# Patient Record
Sex: FEMALE | Race: WHITE | Employment: FULL TIME | URBAN - METROPOLITAN AREA
[De-identification: names, ages, dates, MRNs, and addresses within clinical notes are randomized per-mention and may not be internally consistent; named-entity substitution may affect disease eponyms.]

---

## 2017-01-12 DIAGNOSIS — R92.2 DENSE BREAST TISSUE ON MAMMOGRAM: Primary | ICD-10-CM

## 2017-02-03 DIAGNOSIS — N63.10 MASS OF RIGHT BREAST ON MAMMOGRAM: Primary | ICD-10-CM

## 2017-11-14 ENCOUNTER — HOSPITAL ENCOUNTER (OUTPATIENT)
Age: 57
Setting detail: SPECIMEN
Discharge: HOME | End: 2017-11-14
Payer: COMMERCIAL

## 2017-11-14 ENCOUNTER — OFFICE VISIT (OUTPATIENT)
Dept: OBGYN CLINIC | Age: 57
End: 2017-11-14
Payer: COMMERCIAL

## 2017-11-14 VITALS
SYSTOLIC BLOOD PRESSURE: 134 MMHG | OXYGEN SATURATION: 100 % | BODY MASS INDEX: 32.07 KG/M2 | WEIGHT: 174.25 LBS | DIASTOLIC BLOOD PRESSURE: 86 MMHG | HEART RATE: 70 BPM | HEIGHT: 62 IN

## 2017-11-14 DIAGNOSIS — Z01.419 ENCOUNTER FOR WELL WOMAN EXAM WITH ROUTINE GYNECOLOGICAL EXAM: Primary | ICD-10-CM

## 2017-11-14 DIAGNOSIS — N63.10 BREAST MASS, RIGHT: ICD-10-CM

## 2017-11-14 PROCEDURE — 99396 PREV VISIT EST AGE 40-64: CPT | Performed by: OBSTETRICS & GYNECOLOGY

## 2017-11-15 NOTE — PROGRESS NOTES
Dino Hemphill  2017              61 y.o. Chief Complaint   Patient presents with    Annual Exam     2016 pap wnl isaias 17 abnormal              No referring provider defined for this encounter. The patient was seen and examined. She has no chief complaint today and is here for her annual exam.  Her bowels are regular. There are no voiding complaints. She denies any bloating. She denies vaginal discharge    HPI : 60yo  for annual exam, no gyn complaints.   ________________________________________________________________________    Past Medical History:   Diagnosis Date    Mitral valve prolapse     Thyroid disease                                                                    Past Surgical History:   Procedure Laterality Date    OVARY REMOVAL Right     PILONIDAL CYST EXCISION      SINUS SURGERY      SINUS SURGERY  14    TONSILLECTOMY       Family History   Problem Relation Age of Onset    Breast Cancer Mother     Lung Cancer Father     Colon Cancer Paternal Grandmother     Lung Cancer Brother     Colon Cancer Brother     Colon Cancer Sister      History   Smoking Status    Never Smoker   Smokeless Tobacco    Never Used     History   Alcohol Use    Yes     Comment: occasionally       MEDICATIONS:  Current Outpatient Prescriptions   Medication Sig Dispense Refill    levothyroxine (SYNTHROID) 100 MCG tablet 1 tablet      vitamin E 400 UNIT capsule Take 1,600 Units by mouth daily.  Ascorbic Acid (VITAMIN C GUMMIE PO) Take 2 tablets by mouth daily.  oxyCODONE-acetaminophen (PERCOCET) 5-325 MG per tablet 1 tablet      cephALEXin (KEFLEX) 500 MG capsule Take 1 capsule by mouth 4 times daily. 40 capsule 0    ibuprofen (ADVIL;MOTRIN) 400 MG tablet Take 400 mg by mouth every 6 hours as needed for Pain.  levothyroxine (SYNTHROID) 50 MCG tablet Take 50 mcg by mouth Daily.  guaiFENesin 400 MG tablet Take 800 mg by mouth 3 times daily.       fluticasone Arthritis,Gout,Osteoporosis or Rheumatism  Neurological ROS: No CVA, Migraines, Epilepsy, Seizure Hx, or Limb Weakness  Dermatological ROS: No Rash, Itching, Hives, Mole Changes or Cancer                                                                                                                                                                                                                                PHYSICAL Exam:     Constitutional:  Blood pressure 134/86, pulse 70, height 5' 2\" (1.575 m), weight 174 lb 4 oz (79 kg), last menstrual period 09/05/2014, SpO2 100 %, not currently breastfeeding. General Appearance: This  is a well Developed, well Nourished, well groomed female. Her BMI was reviewed. Nutritional decision making was discussed. Skin:  There was a Normal Inspection of the skin without rashes or lesions. There were no rashes. (Papular, Maculopapular, Hives, Pustular, Macular)     There were no lesions (Ulcers, Erythema, Abn. Appearing Nevi)        Lymphatic:  No Lymph Nodes were Palpable in the neck , axilla or groin. Inguinal lymph nodes wnl     Neck and EENT:  The neck was supple. There were no masses   The thyroid was not enlarged and had no masses. Respiratory: The lungs were auscultated and found to be clear. There were no rales, rhonchi or wheezes. There was a good respiratory effort. Cardiovascular: The heart was in a regular rate and rhythm. . No S3 or S4. There was no murmur appreciated. Location, grade, and radiation are not applicable. Extremities: The patients extremities were without calf tenderness, edema, or varicosities. There was full range of motion in all four extremities. Abdomen: The abdomen was soft and non-tender. There were good bowel sounds in all quadrants and there was no guarding, rebound or rigidity. On evaluation there was no evidence of hepatosplenomegaly and there was no costal vertebral sarah tenderness bilaterally.   No hernias

## 2017-11-22 LAB — CYTOLOGY REPORT: NORMAL

## 2017-11-28 ENCOUNTER — HOSPITAL ENCOUNTER (OUTPATIENT)
Age: 57
Setting detail: SPECIMEN
Discharge: HOME | End: 2017-11-28
Payer: COMMERCIAL

## 2017-11-28 ENCOUNTER — OFFICE VISIT (OUTPATIENT)
Dept: OBGYN CLINIC | Age: 57
End: 2017-11-28
Payer: COMMERCIAL

## 2017-11-28 VITALS
HEIGHT: 62 IN | DIASTOLIC BLOOD PRESSURE: 89 MMHG | OXYGEN SATURATION: 97 % | WEIGHT: 174.25 LBS | BODY MASS INDEX: 32.07 KG/M2 | HEART RATE: 63 BPM | SYSTOLIC BLOOD PRESSURE: 123 MMHG

## 2017-11-28 DIAGNOSIS — N90.89 VULVAL LESION: Primary | ICD-10-CM

## 2017-11-28 PROCEDURE — 56605 BIOPSY OF VULVA/PERINEUM: CPT | Performed by: OBSTETRICS & GYNECOLOGY

## 2017-11-29 NOTE — PROGRESS NOTES
Vulvar lesion excision:    Area prepped with Betadine, injected with 1% lidocaine, using 3mm Keys punch area excised in entirety. Removed with scissors and hemostasis obtained with silver nitrate. Tolerated well. Counts correct. Follow up pathology. Wound care reviewed.

## 2017-11-30 LAB — SURGICAL PATHOLOGY REPORT: NORMAL

## 2017-12-04 ENCOUNTER — HOSPITAL ENCOUNTER (OUTPATIENT)
Dept: ULTRASOUND IMAGING | Age: 57
Discharge: HOME | End: 2017-12-04
Payer: COMMERCIAL

## 2017-12-04 ENCOUNTER — HOSPITAL ENCOUNTER (OUTPATIENT)
Dept: MAMMOGRAPHY | Age: 57
Discharge: HOME | End: 2017-12-04
Payer: COMMERCIAL

## 2017-12-04 DIAGNOSIS — N63.10 MASS OF RIGHT BREAST ON MAMMOGRAM: ICD-10-CM

## 2017-12-04 DIAGNOSIS — N63.10 BREAST MASS, RIGHT: ICD-10-CM

## 2017-12-04 PROCEDURE — 76642 ULTRASOUND BREAST LIMITED: CPT

## 2017-12-04 PROCEDURE — G0279 TOMOSYNTHESIS, MAMMO: HCPCS

## 2019-03-04 ENCOUNTER — HOSPITAL ENCOUNTER (OUTPATIENT)
Age: 59
Setting detail: SPECIMEN
Discharge: HOME | End: 2019-03-04
Payer: COMMERCIAL

## 2019-03-04 ENCOUNTER — OFFICE VISIT (OUTPATIENT)
Dept: OBGYN CLINIC | Age: 59
End: 2019-03-04
Payer: COMMERCIAL

## 2019-03-04 VITALS
SYSTOLIC BLOOD PRESSURE: 127 MMHG | WEIGHT: 200 LBS | BODY MASS INDEX: 36.58 KG/M2 | HEART RATE: 69 BPM | DIASTOLIC BLOOD PRESSURE: 82 MMHG

## 2019-03-04 DIAGNOSIS — Z01.419 WELL WOMAN EXAM WITH ROUTINE GYNECOLOGICAL EXAM: Primary | ICD-10-CM

## 2019-03-04 DIAGNOSIS — Z12.39 BREAST CANCER SCREENING: ICD-10-CM

## 2019-03-04 DIAGNOSIS — Z12.11 COLON CANCER SCREENING: ICD-10-CM

## 2019-03-04 PROCEDURE — G8484 FLU IMMUNIZE NO ADMIN: HCPCS | Performed by: OBSTETRICS & GYNECOLOGY

## 2019-03-04 PROCEDURE — 99396 PREV VISIT EST AGE 40-64: CPT | Performed by: OBSTETRICS & GYNECOLOGY

## 2019-03-15 LAB — CYTOLOGY REPORT: NORMAL

## 2019-03-26 ENCOUNTER — HOSPITAL ENCOUNTER (OUTPATIENT)
Dept: MAMMOGRAPHY | Age: 59
Discharge: HOME | End: 2019-03-28
Payer: COMMERCIAL

## 2019-03-26 ENCOUNTER — TELEPHONE (OUTPATIENT)
Dept: GASTROENTEROLOGY | Age: 59
End: 2019-03-26

## 2019-03-26 DIAGNOSIS — Z12.39 BREAST CANCER SCREENING: ICD-10-CM

## 2019-03-26 PROCEDURE — 77063 BREAST TOMOSYNTHESIS BI: CPT

## 2019-10-12 ENCOUNTER — HOSPITAL ENCOUNTER (EMERGENCY)
Age: 59
Discharge: HOME | End: 2019-10-12
Attending: EMERGENCY MEDICINE
Payer: COMMERCIAL

## 2019-10-12 ENCOUNTER — APPOINTMENT (OUTPATIENT)
Dept: GENERAL RADIOLOGY | Age: 59
End: 2019-10-12
Payer: COMMERCIAL

## 2019-10-12 VITALS
DIASTOLIC BLOOD PRESSURE: 83 MMHG | RESPIRATION RATE: 18 BRPM | HEART RATE: 67 BPM | WEIGHT: 200 LBS | BODY MASS INDEX: 36.58 KG/M2 | OXYGEN SATURATION: 99 % | TEMPERATURE: 97.3 F | SYSTOLIC BLOOD PRESSURE: 138 MMHG

## 2019-10-12 DIAGNOSIS — S62.502A CLOSED FRACTURE DISLOCATION OF LEFT THUMB, INITIAL ENCOUNTER: Primary | ICD-10-CM

## 2019-10-12 PROCEDURE — 96372 THER/PROPH/DIAG INJ SC/IM: CPT

## 2019-10-12 PROCEDURE — 73060 X-RAY EXAM OF HUMERUS: CPT

## 2019-10-12 PROCEDURE — 6360000002 HC RX W HCPCS: Performed by: STUDENT IN AN ORGANIZED HEALTH CARE EDUCATION/TRAINING PROGRAM

## 2019-10-12 PROCEDURE — 99283 EMERGENCY DEPT VISIT LOW MDM: CPT

## 2019-10-12 PROCEDURE — 6370000000 HC RX 637 (ALT 250 FOR IP): Performed by: STUDENT IN AN ORGANIZED HEALTH CARE EDUCATION/TRAINING PROGRAM

## 2019-10-12 PROCEDURE — 73130 X-RAY EXAM OF HAND: CPT

## 2019-10-12 RX ORDER — IBUPROFEN 800 MG/1
800 TABLET ORAL EVERY 6 HOURS PRN
Qty: 21 TABLET | Refills: 0 | Status: SHIPPED | OUTPATIENT
Start: 2019-10-12

## 2019-10-12 RX ORDER — ACETAMINOPHEN 325 MG/1
650 TABLET ORAL ONCE
Status: COMPLETED | OUTPATIENT
Start: 2019-10-12 | End: 2019-10-12

## 2019-10-12 RX ORDER — KETOROLAC TROMETHAMINE 15 MG/ML
15 INJECTION, SOLUTION INTRAMUSCULAR; INTRAVENOUS ONCE
Status: COMPLETED | OUTPATIENT
Start: 2019-10-12 | End: 2019-10-12

## 2019-10-12 RX ADMIN — ACETAMINOPHEN 650 MG: 325 TABLET ORAL at 18:36

## 2019-10-12 RX ADMIN — KETOROLAC TROMETHAMINE 15 MG: 15 INJECTION, SOLUTION INTRAMUSCULAR; INTRAVENOUS at 18:36

## 2019-10-12 ASSESSMENT — PAIN DESCRIPTION - LOCATION: LOCATION: FINGER (COMMENT WHICH ONE)

## 2019-10-12 ASSESSMENT — PAIN SCALES - GENERAL
PAINLEVEL_OUTOF10: 7
PAINLEVEL_OUTOF10: 7

## 2019-10-12 ASSESSMENT — PAIN DESCRIPTION - FREQUENCY: FREQUENCY: CONTINUOUS

## 2019-10-12 ASSESSMENT — PAIN DESCRIPTION - DESCRIPTORS: DESCRIPTORS: ACHING;THROBBING

## 2019-10-12 ASSESSMENT — PAIN DESCRIPTION - PAIN TYPE: TYPE: ACUTE PAIN

## 2019-10-12 ASSESSMENT — PAIN DESCRIPTION - ORIENTATION: ORIENTATION: LEFT

## 2019-10-22 ENCOUNTER — OFFICE VISIT (OUTPATIENT)
Dept: BURN CARE | Age: 59
End: 2019-10-22
Payer: COMMERCIAL

## 2019-10-22 VITALS
SYSTOLIC BLOOD PRESSURE: 123 MMHG | HEIGHT: 62 IN | HEART RATE: 56 BPM | DIASTOLIC BLOOD PRESSURE: 78 MMHG | WEIGHT: 199.6 LBS | BODY MASS INDEX: 36.73 KG/M2

## 2019-10-22 DIAGNOSIS — S63.642A SPRAIN OF METACARPOPHALANGEAL (MCP) JOINT OF LEFT THUMB, INITIAL ENCOUNTER: Primary | ICD-10-CM

## 2019-10-22 PROCEDURE — 99202 OFFICE O/P NEW SF 15 MIN: CPT | Performed by: PLASTIC SURGERY

## 2019-11-12 ENCOUNTER — OFFICE VISIT (OUTPATIENT)
Dept: BURN CARE | Age: 59
End: 2019-11-12
Payer: COMMERCIAL

## 2019-11-12 VITALS
SYSTOLIC BLOOD PRESSURE: 134 MMHG | HEIGHT: 62 IN | HEART RATE: 82 BPM | WEIGHT: 195 LBS | BODY MASS INDEX: 35.88 KG/M2 | DIASTOLIC BLOOD PRESSURE: 79 MMHG

## 2019-11-12 DIAGNOSIS — S62.292D: Primary | ICD-10-CM

## 2019-11-12 PROCEDURE — 99212 OFFICE O/P EST SF 10 MIN: CPT

## 2019-11-12 PROCEDURE — 99212 OFFICE O/P EST SF 10 MIN: CPT | Performed by: PLASTIC SURGERY

## 2019-11-26 ENCOUNTER — OFFICE VISIT (OUTPATIENT)
Dept: BURN CARE | Age: 59
End: 2019-11-26
Payer: COMMERCIAL

## 2019-11-26 VITALS
WEIGHT: 187 LBS | HEIGHT: 62 IN | DIASTOLIC BLOOD PRESSURE: 88 MMHG | HEART RATE: 62 BPM | BODY MASS INDEX: 34.41 KG/M2 | SYSTOLIC BLOOD PRESSURE: 148 MMHG

## 2019-11-26 DIAGNOSIS — S69.90XD INJURY OF HAND, UNSPECIFIED LATERALITY, SUBSEQUENT ENCOUNTER: ICD-10-CM

## 2019-11-26 PROCEDURE — 99212 OFFICE O/P EST SF 10 MIN: CPT | Performed by: PLASTIC SURGERY

## 2020-01-08 ENCOUNTER — APPOINTMENT (OUTPATIENT)
Dept: GENERAL RADIOLOGY | Age: 60
End: 2020-01-08
Payer: COMMERCIAL

## 2020-01-08 ENCOUNTER — HOSPITAL ENCOUNTER (EMERGENCY)
Age: 60
Discharge: HOME | End: 2020-01-08
Attending: EMERGENCY MEDICINE
Payer: COMMERCIAL

## 2020-01-08 VITALS
DIASTOLIC BLOOD PRESSURE: 85 MMHG | OXYGEN SATURATION: 99 % | RESPIRATION RATE: 14 BRPM | HEIGHT: 62 IN | TEMPERATURE: 98.1 F | BODY MASS INDEX: 34.04 KG/M2 | WEIGHT: 185 LBS | HEART RATE: 71 BPM | SYSTOLIC BLOOD PRESSURE: 151 MMHG

## 2020-01-08 PROCEDURE — 99283 EMERGENCY DEPT VISIT LOW MDM: CPT

## 2020-01-08 PROCEDURE — 29125 APPL SHORT ARM SPLINT STATIC: CPT

## 2020-01-08 PROCEDURE — 73130 X-RAY EXAM OF HAND: CPT

## 2020-01-08 ASSESSMENT — ENCOUNTER SYMPTOMS
COUGH: 0
WHEEZING: 0
NAUSEA: 0
COLOR CHANGE: 0
ABDOMINAL PAIN: 0
VOMITING: 0

## 2020-01-08 ASSESSMENT — PAIN SCALES - GENERAL: PAINLEVEL_OUTOF10: 3

## 2020-01-08 ASSESSMENT — PAIN DESCRIPTION - ORIENTATION: ORIENTATION: LEFT

## 2020-01-08 ASSESSMENT — PAIN DESCRIPTION - DESCRIPTORS: DESCRIPTORS: ACHING;SHOOTING

## 2020-01-08 ASSESSMENT — PAIN DESCRIPTION - FREQUENCY: FREQUENCY: INTERMITTENT

## 2020-01-08 NOTE — ED PROVIDER NOTES
[facility]  ED  Emergency Department Encounter  Advanced Practice Provider     Pt Name: Tanvir Ramirez  MRN: [id number]  [place] 1960  Date of evaluation: 1/8/20  PCP:  No primary care provider on file. CHIEF COMPLAINT       Chief Complaint   Patient presents with    Hand Pain     left thumb pain, was seen here a while ago . .. grandson pulled on thumb a week ago , having pain and decreased ROM       HISTORY OF PRESENT ILLNESS  (Location/Symptom, Timing/Onset,Context/Setting, Quality, Duration, Modifying Factors, Severity.)      Tanvir Ramirez is a 60 y.o. female who presents with left thumb pain. Patient states that about 1 week ago her grandson playfully tugged on her left thumb and ever since then she has had swelling and pain. In October she did fall and had a fracture to her thumb and a ligamentous injury. She was splinted and then in a Velcro splint but states that her range of motion had gotten much much better and the pain had gone away prior to 1 week ago. She denies any numbness or tingling. She is right-hand dominant. She has been attempting to call the specialty clinic but has been unable to get any response    PAST MEDICAL /SURGICAL / SOCIAL / FAMILY HISTORY      has a past medical history of Mitral valve prolapse and Thyroid disease. has a past surgical history that includes sinus surgery; Tonsillectomy; Pilonidal cyst excision; Ovary removal (Right); and sinus surgery (11/14/14).     Social History     Socioeconomic History    Marital status:      Spouse name: Not on file    Number of children: Not on file    Years of education: Not on file    Highest education level: Not on file   Occupational History    Not on file   Social Needs    Financial resource strain: Not on file    Food insecurity:     Worry: Not on file     Inability: Not on file    Transportation needs:     Medical: Not on file     Non-medical: Not on file   Tobacco Use    Smoking status: Never Smoker    Smokeless tobacco: Never Used   Substance and Sexual Activity    Alcohol use: Yes     Comment: occasionally    Drug use: No    Sexual activity: Yes     Partners: Male   Lifestyle    Physical activity:     Days per week: Not on file     Minutes per session: Not on file    Stress: Not on file   Relationships    Social connections:     Talks on phone: Not on file     Gets together: Not on file     Attends Ca[employer]ism service: Not on file     Active member of club or organization: Not on file     Attends meetings of clubs or organizations: Not on file     Relationship status: Not on file    Intimate partner violence:     Fear of current or ex partner: Not on file     Emotionally abused: Not on file     Physically abused: Not on file     Forced sexual activity: Not on file   Other Topics Concern    Not on file   Social History Narrative    Not on file       Family History   Problem Relation Age of Onset    Breast Cancer Mother    Waldo Card Father     Colon Cancer Paternal Grandmother     Lung Cancer Brother     Colon Cancer Brother     Colon Cancer Sister        Allergies:  Codeine and Demerol hcl [meperidine]    Home Medications:  Prior to Admission medications    Medication Sig Start Date End Date Taking? Authorizing Provider   ibuprofen (IBU) 800 MG tablet Take 1 tablet by mouth every 6 hours as needed for Pain 10/12/19   Larkrystal Ross, DO   BIOTIN PO Take by mouth    Historical Provider, MD   levothyroxine (SYNTHROID) 100 MCG tablet 1 tablet 11/3/16   Historical Provider, MD   fluticasone (FLONASE) 50 MCG/ACT nasal spray 2 sprays by Nasal route daily. Historical Provider, MD   vitamin E 400 UNIT capsule Take 1,600 Units by mouth daily. Historical Provider, MD   Ascorbic Acid (VITAMIN C GUMMIE PO) Take 2 tablets by mouth daily. Historical Provider, MD       patient's medication list has been reviewed as entered by the nursing staff.     REVIEW OF SYSTEMS    (2-9 systems for level 4, 10 or more for level 5)      Review of Systems   Constitutional: Negative for chills and fever. Respiratory: Negative for cough and wheezing. Cardiovascular: Negative for chest pain. Gastrointestinal: Negative for abdominal pain, nausea and vomiting. Musculoskeletal: Positive for arthralgias and myalgias. Skin: Negative for color change and wound. PHYSICAL EXAM  (up to 7 for level 4, 8 or more for level 5)      INITIAL VITALS:  height is 5' 2\" (1.575 m) and weight is 185 lb (83.9 kg). Her oral temperature is 98.1 °F (36.7 °C). Her blood pressure is 151/85 (abnormal) and her pulse is 71. Her respiration is 14 and oxygen saturation is 99%. Physical Exam  Constitutional:       Appearance: She is well-developed. She is not diaphoretic. HENT:      Head: Normocephalic and atraumatic. Right Ear: External ear normal.      Left Ear: External ear normal.   Eyes:      General: No scleral icterus. Right eye: No discharge. Left eye: No discharge. Neck:      Trachea: No tracheal deviation. Pulmonary:      Effort: Pulmonary effort is normal. No respiratory distress. Breath sounds: No stridor. Musculoskeletal:      Left hand: She exhibits decreased range of motion, tenderness, bony tenderness and swelling. She exhibits normal two-point discrimination, normal capillary refill, no deformity and no laceration. Comments: With diffuse swelling to the thumb and pain at the base of the thumb. There is ligamentous laxity with axial loading. There is reduced range of motion with adduction    Skin:     General: Skin is warm and dry. Neurological:      Mental Status: She is alert and oriented to person, place, and time.       Coordination: Coordination normal.   Psychiatric:         Behavior: Behavior normal.           PLAN (LABS / IMAGING / EKG):  Orders Placed This Encounter   Procedures    XR HAND LEFT (MIN 3 VIEWS)    Velcro Thumb Spica       MEDICATIONS

## 2020-01-08 NOTE — ED NOTES
Pt to ED with c/o left thumb pain. Pt states she was seen here before a while ago, but grandson pulled on thumb about a week ago and she's had continued worsening pain and limited ROM since. Pt states she f/u at clinic but worried something is wrong. Pt states she has been taking ibuprofen with limited relief. Pt arrived in NAD, rr even and unlabored.      Rapheal Crigler, RN  01/08/20 1516

## 2020-01-08 NOTE — ED PROVIDER NOTES
[facility]     Emergency Department     Faculty Attestation    I performed a history and physical examination of the patient and discussed management with the resident. I have reviewed and agree with the residents findings including all diagnostic interpretations, and treatment plans as written at the time of my review. Any areas of disagreement are noted on the chart. I was personally present for the key portions of any procedures. I have documented in the chart those procedures where I was not present during the key portions. For Physician Assistant/ Nurse Practitioner cases/documentation I have personally evaluated this patient and have completed at least one if not all key elements of the E/M (history, physical exam, and MDM). Additional findings are as noted. Primary Care Physician: No primary care provider on file. History: This is a 60 y.o. female who presents to the Emergency Department with complaint of thumb pain. The patient is a right-hand-dominant individual who had a her grandson pulled on her left thumb. She has had a previous injury to that left thumb. Since the injury 1 week ago she is having increasing pain and decreased range of motion. She is been using ice and Motrin without any significant improvement of her symptoms. Physical:   height is 5' 2\" (1.575 m) and weight is 185 lb (83.9 kg). Her oral temperature is 98.1 °F (36.7 °C). Her blood pressure is 151/85 (abnormal) and her pulse is 71. Her respiration is 14 and oxygen saturation is 99%. There is tenderness to palpation in the left thumb from the interphalangeal joint to the MCP. There is decreased range of motion secondary to pain.     Impression: Thumb pain    Plan: X-ray, analgesia      (Please note that portions of this note were completed with a voice recognition program.  Efforts were made to edit the dictations but occasionally words are

## 2020-01-28 ENCOUNTER — OFFICE VISIT (OUTPATIENT)
Dept: BURN CARE | Age: 60
End: 2020-01-28
Payer: COMMERCIAL

## 2020-01-28 VITALS
WEIGHT: 191 LBS | BODY MASS INDEX: 35.15 KG/M2 | SYSTOLIC BLOOD PRESSURE: 127 MMHG | HEIGHT: 62 IN | DIASTOLIC BLOOD PRESSURE: 84 MMHG | HEART RATE: 72 BPM

## 2020-01-28 PROCEDURE — 99212 OFFICE O/P EST SF 10 MIN: CPT | Performed by: PLASTIC SURGERY

## 2020-01-28 RX ORDER — CALCIPOTRIENE 50 UG/G
CREAM TOPICAL
COMMUNITY
Start: 2020-01-15

## 2020-03-10 ENCOUNTER — HOSPITAL ENCOUNTER (OUTPATIENT)
Age: 60
Setting detail: SPECIMEN
Discharge: HOME | End: 2020-03-10
Payer: COMMERCIAL

## 2020-03-10 ENCOUNTER — OFFICE VISIT (OUTPATIENT)
Dept: OBGYN CLINIC | Age: 60
End: 2020-03-10
Payer: COMMERCIAL

## 2020-03-10 VITALS
BODY MASS INDEX: 32.65 KG/M2 | DIASTOLIC BLOOD PRESSURE: 78 MMHG | SYSTOLIC BLOOD PRESSURE: 122 MMHG | WEIGHT: 196 LBS | HEIGHT: 65 IN

## 2020-03-10 PROCEDURE — 99396 PREV VISIT EST AGE 40-64: CPT | Performed by: OBSTETRICS & GYNECOLOGY

## 2020-03-10 ASSESSMENT — PATIENT HEALTH QUESTIONNAIRE - PHQ9
SUM OF ALL RESPONSES TO PHQ9 QUESTIONS 1 & 2: 0
1. LITTLE INTEREST OR PLEASURE IN DOING THINGS: 0
2. FEELING DOWN, DEPRESSED OR HOPELESS: 0
SUM OF ALL RESPONSES TO PHQ QUESTIONS 1-9: 0
SUM OF ALL RESPONSES TO PHQ QUESTIONS 1-9: 0

## 2020-03-10 NOTE — PROGRESS NOTES
rashes or lesions. Neck:  The neck was supple. There is no tracheal deviation, thyromegaly or supraclavicular adenopathy appreciated. Breast:   The patients breasts were symmetrical.  Breasts are nontender and there  were no masses, discharge or pathologic skin changes. There is no supraclavicular or axillary adenopathy bilaterally. Respiratory: There was unlabored respiratory effort. Lungs clear to ascultation without wheezes, rales or rhonchi in all fields bilaterally. Cardiovascular:  Normal sinus rhythm with a regular rate and without murmur, rubs or gallops. Abdomen: The abdomen was soft and non-tender with no guarding, rebound, CVAT or rigidity. No hernias were appreciated. Bowel sounds were normally active. Pelvic exam:  No vulvar, vaginal or cervical lesions are noted. Normal vaginal discharge present, grade 2 cystocele, moderate rectocele and enterocele noted. Uterus nongravid and without CMT and adnexa nontender and without abnormal masses bilaterally. Extremities:  FROM and nontender without clubbing cyanosis or edema. ASSESSMENT:         ICD-10-CM    1. Encounter for gynecological examination without abnormal finding Z01.419 PAP SMEAR   2. Visit for screening mammogram Z12.31 KEIRY DIGITAL SCREEN W CAD BILATERAL                   PLAN:    Return to the office in 1 year or when necessary  Patient was seen with total face to face time of 20 minutes. Ken Pleitez M.D., Mph  [facility]  [place]

## 2020-03-10 NOTE — PATIENT INSTRUCTIONS
Please get your mammogram done as scheduled. We will contact you with the results of your mammogram as well as today's Pap smear. Remember to call [facility] gastroenterology for your colonoscopy. 3  Return to the office in 1 year or as needed. Have a blessed year!

## 2020-03-19 LAB — CYTOLOGY REPORT: NORMAL

## 2020-05-29 ENCOUNTER — HOSPITAL ENCOUNTER (OUTPATIENT)
Age: 60
Setting detail: SPECIMEN
Discharge: HOME | End: 2020-05-29
Payer: COMMERCIAL

## 2020-05-29 LAB
ANION GAP SERPL CALCULATED.3IONS-SCNC: 18 MMOL/L (ref 9–17)
BUN BLDV-MCNC: 14 MG/DL (ref 8–23)
BUN/CREAT BLD: ABNORMAL (ref 9–20)
CALCIUM SERPL-MCNC: 9.3 MG/DL (ref 8.6–10.4)
CHLORIDE BLD-SCNC: 106 MMOL/L (ref 98–107)
CHOLESTEROL/HDL RATIO: 3
CHOLESTEROL: 230 MG/DL
CO2: 20 MMOL/L (ref 20–31)
CREAT SERPL-MCNC: 0.67 MG/DL (ref 0.5–0.9)
GFR AFRICAN AMERICAN: >60 ML/MIN
GFR NON-AFRICAN AMERICAN: >60 ML/MIN
GFR SERPL CREATININE-BSD FRML MDRD: ABNORMAL ML/MIN/{1.73_M2}
GFR SERPL CREATININE-BSD FRML MDRD: ABNORMAL ML/MIN/{1.73_M2}
GLUCOSE BLD-MCNC: 90 MG/DL (ref 70–99)
HCT VFR BLD CALC: 43.2 % (ref 36.3–47.1)
HDLC SERPL-MCNC: 77 MG/DL
HEMOGLOBIN: 13.4 G/DL (ref 11.9–15.1)
LDL CHOLESTEROL: 128 MG/DL (ref 0–130)
MCH RBC QN AUTO: 28 PG (ref 25.2–33.5)
MCHC RBC AUTO-ENTMCNC: 31 G/DL (ref 28.4–34.8)
MCV RBC AUTO: 90.4 FL (ref 82.6–102.9)
NRBC AUTOMATED: 0 PER 100 WBC
PDW BLD-RTO: 13.3 % (ref 11.8–14.4)
PLATELET # BLD: 291 K/UL (ref 138–453)
PMV BLD AUTO: 10.1 FL (ref 8.1–13.5)
POTASSIUM SERPL-SCNC: 4.4 MMOL/L (ref 3.7–5.3)
RBC # BLD: 4.78 M/UL (ref 3.95–5.11)
SODIUM BLD-SCNC: 144 MMOL/L (ref 135–144)
TRIGL SERPL-MCNC: 127 MG/DL
TSH SERPL DL<=0.05 MIU/L-ACNC: 2.77 MIU/L (ref 0.3–5)
VITAMIN B-12: 1686 PG/ML (ref 232–1245)
VITAMIN D 25-HYDROXY: 18.6 NG/ML (ref 30–100)
VLDLC SERPL CALC-MCNC: ABNORMAL MG/DL (ref 1–30)
WBC # BLD: 7.6 K/UL (ref 3.5–11.3)

## 2020-06-08 ENCOUNTER — HOSPITAL ENCOUNTER (OUTPATIENT)
Dept: MAMMOGRAPHY | Age: 60
Discharge: HOME | End: 2020-06-10
Payer: COMMERCIAL

## 2020-06-08 PROCEDURE — 77063 BREAST TOMOSYNTHESIS BI: CPT

## 2020-07-07 ENCOUNTER — HOSPITAL ENCOUNTER (OUTPATIENT)
Age: 60
Setting detail: SPECIMEN
Discharge: HOME | End: 2020-07-07
Payer: COMMERCIAL

## 2020-07-07 ENCOUNTER — OFFICE VISIT (OUTPATIENT)
Dept: OBGYN CLINIC | Age: 60
End: 2020-07-07
Payer: COMMERCIAL

## 2020-07-07 VITALS
HEIGHT: 65 IN | SYSTOLIC BLOOD PRESSURE: 122 MMHG | TEMPERATURE: 97 F | BODY MASS INDEX: 32.15 KG/M2 | DIASTOLIC BLOOD PRESSURE: 72 MMHG | WEIGHT: 193 LBS

## 2020-07-07 PROCEDURE — 99213 OFFICE O/P EST LOW 20 MIN: CPT | Performed by: OBSTETRICS & GYNECOLOGY

## 2020-07-07 NOTE — PROGRESS NOTES
Jo Lopez presents today with a history of abnormal vaginal discharge. The discharge has been present for 3 to 4 weeks and is not associated with foul  odor, irritation or itching. She is also been experiencing some urinary frequency with urgency. Her final complaint is of feeling as if things were falling out of her vagina when she stands on concrete for prolonged periods of time. She does have a history of mild incontinence. She denies any fever, chills, nausea/vomiting,  abdominal/pelvic discomfort, or vaginal bleeding. Physical exam:    Vitals:    07/07/20 1402   BP: 122/72   Site: Right Upper Arm   Position: Sitting   Cuff Size: Medium Adult   Temp: 97 °F (36.1 °C)   Weight: 193 lb (87.5 kg)   Height: 5' 5\" (1.651 m)       External genitalia without lesions or inflammation noted. The vagina with scant amount of normal appearing discharge and no vaginal or cervical lesions or inflammation noted. 2-3 degree cystocele. Uterine prolapse uterus nongravid, 1-2 degree prolapse with Valsalva. No CMT. Adnexa nontender and without abnormal masses bilaterally. Assessment/Plan   Diagnosis Orders   1. Urinary urgency     2. Vaginal discharge     3. Cystocele with uterine prolapse         Vaginitis DNA probe was done. Urine culture was sent. She will be contacted with results and recommendations. She was educated on pelvic organ prolapse and she will monitor her symptoms. She will return to the office for her next scheduled appointment or prn. Patient was seen with total face to face time of 15 minutes.

## 2020-07-08 LAB
DIRECT EXAM: NORMAL
Lab: NORMAL
SPECIMEN DESCRIPTION: NORMAL

## 2020-07-14 ENCOUNTER — TELEPHONE (OUTPATIENT)
Dept: OBGYN CLINIC | Age: 60
End: 2020-07-14

## 2021-03-31 ENCOUNTER — TELEPHONE (OUTPATIENT)
Dept: OBGYN CLINIC | Age: 61
End: 2021-03-31

## 2021-03-31 NOTE — TELEPHONE ENCOUNTER
SALENAM letting pt know Dr. Won Velze will be out of the office tomorrow. Asked pt to call our office to r/s apt.

## 2021-04-15 ENCOUNTER — OFFICE VISIT (OUTPATIENT)
Dept: OBGYN CLINIC | Age: 61
End: 2021-04-15
Payer: COMMERCIAL

## 2021-04-15 VITALS
HEIGHT: 65 IN | WEIGHT: 198 LBS | BODY MASS INDEX: 32.99 KG/M2 | DIASTOLIC BLOOD PRESSURE: 80 MMHG | SYSTOLIC BLOOD PRESSURE: 124 MMHG

## 2021-04-15 DIAGNOSIS — Z80.41 FAMILY HISTORY OF OVARIAN CANCER: ICD-10-CM

## 2021-04-15 DIAGNOSIS — Z80.3 FAMILY HISTORY OF BREAST CANCER IN FIRST DEGREE RELATIVE: ICD-10-CM

## 2021-04-15 DIAGNOSIS — R92.2 DENSE BREAST TISSUE ON MAMMOGRAM: ICD-10-CM

## 2021-04-15 DIAGNOSIS — Z80.0 FAMILY HISTORY OF COLON CANCER IN MOTHER: ICD-10-CM

## 2021-04-15 DIAGNOSIS — Z12.31 VISIT FOR SCREENING MAMMOGRAM: ICD-10-CM

## 2021-04-15 DIAGNOSIS — Z01.419 ENCOUNTER FOR GYNECOLOGICAL EXAMINATION WITHOUT ABNORMAL FINDING: Primary | ICD-10-CM

## 2021-04-15 PROCEDURE — 99396 PREV VISIT EST AGE 40-64: CPT | Performed by: OBSTETRICS & GYNECOLOGY

## 2021-04-15 RX ORDER — CHLORAL HYDRATE 500 MG
3000 CAPSULE ORAL 3 TIMES DAILY
COMMUNITY

## 2021-04-15 ASSESSMENT — PATIENT HEALTH QUESTIONNAIRE - PHQ9
SUM OF ALL RESPONSES TO PHQ QUESTIONS 1-9: 0
SUM OF ALL RESPONSES TO PHQ QUESTIONS 1-9: 0

## 2021-04-15 NOTE — PATIENT INSTRUCTIONS
Please get your MRI and mammogram done as scheduled. We will contact you with those results as well as today's Pap smear. A consultation will be put into the genetics counselor should you desire to do it. Return to the office in 1 year or as needed. Happy upcoming birthday and have a safe and healthy 2021!

## 2021-04-15 NOTE — PROGRESS NOTES
DATE OF VISIT:  4/15/21        History and Physical    Rigoberto Hernandez    :  1960  CHIEF COMPLAINT:    Chief Complaint   Patient presents with    Annual Exam     Here for annual Last pap 03/10/20 neg Last mammogram 20 neg recommended MRI                     HPI :   Rigoberto Hernandez is a 63 y.o. femaleGRAVIDA 3 PARA 3003    Rigoberto Hernandez returns today for her annual exam.  She presents today stating that she feels vaginal dryness throughout the daytime and with intercourse. This just started 6 to 8 months ago. Tonia Blake is approximately 5 to 6 years menopausal.  She does have a family history in her mother of what she believed was breast and ovarian cancer  She also has a strong family history for colon cancer. Colonoscopy last year she states was negative. She is having regular bowel movements without constipation or diarrhea. She denies any significant involuntary loss of urine.   She is otherwise without any significant complaints today.  _____________________________________________________________________  Past Medical History:   Diagnosis Date    Mitral valve prolapse     Thyroid disease                                                                    Past Surgical History:   Procedure Laterality Date    OVARY REMOVAL Right     PILONIDAL CYST EXCISION      SINUS SURGERY      SINUS SURGERY  14    TONSILLECTOMY       Family History   Problem Relation Age of Onset    Breast Cancer Mother     Lung Cancer Father     Colon Cancer Paternal Grandmother     Lung Cancer Brother     Colon Cancer Brother     Colon Cancer Sister      Social History     Tobacco Use   Smoking Status Never Smoker   Smokeless Tobacco Never Used     Social History     Substance and Sexual Activity   Alcohol Use Yes    Comment: occasionally     Current Outpatient Medications   Medication Sig Dispense Refill    Omega-3 Fatty Acids (FISH OIL) 1000 MG CAPS Take 3,000 mg by mouth 3 times daily      BIOTIN PO Take by mouth      levothyroxine (SYNTHROID) 100 MCG tablet 1 tablet      fluticasone (FLONASE) 50 MCG/ACT nasal spray 2 sprays by Nasal route daily.  vitamin E 400 UNIT capsule Take 1,600 Units by mouth daily.  Ascorbic Acid (VITAMIN C GUMMIE PO) Take 2 tablets by mouth daily.  calcipotriene (DOVONEX) 0.005 % cream       ibuprofen (IBU) 800 MG tablet Take 1 tablet by mouth every 6 hours as needed for Pain (Patient not taking: Reported on 4/15/2021) 21 tablet 0     No current facility-administered medications for this visit. Allergies: Allergies   Allergen Reactions    Codeine Nausea Only    Demerol Hcl [Meperidine] Nausea Only       Gynecologic History:  Patient's last menstrual period was 2014 (within weeks).   Sexually Active: Yes  STD History: No  Abnormal Pap History yes  Birth Control: No    OB History    Para Term  AB Living   3 3 0 0 0 3   SAB TAB Ectopic Molar Multiple Live Births   0 0 0 0 0 0     ______________________________________________________________________  REVIEW OF SYSTEMS:        Constitutional:  Unexpected weight change no  Neurological:  Frequent headaches  no  Ophthalmic:  Recent visual changes no  ENT:   Difficulty swallowing  no  Breast:              Masses   no     Respiratory:  Shortness of breath  no    Cardiovascular: Chest pain   no     Gastrointestinal: Chronic diarrhea/constipation no   Urogenital:  Urinary incontinence  no                                         Heavy/irregular periods           no                                      Vaginal discharge                   no  Hematological: Bruises easy   no     Endocrine:  Nipple Discharge  no     Hot/Cold Intolerance  no   Psychological:  Mood and affect were wnl yes                                                                                                                                           Physical Exam:    Vitals:    04/15/21 1529   BP: 124/80   Site: Right Upper Arm history of colon cancer in mother  Z80.0 MRI BREAST BILATERAL W WO CONTRAST   6. Family history of ovarian cancer  Z80.41                    PLAN:  We discussed the possibility of a hereditary familial cancer syndrome and genetic counseling/testing. She is undecided at the moment however referral was made should she change her mind. Return to the office in 1 year or when necessary      Lala Shaw M.D., [address] [place] [address] [facility]  [place]

## 2021-04-27 DIAGNOSIS — Z01.419 ENCOUNTER FOR GYNECOLOGICAL EXAMINATION WITHOUT ABNORMAL FINDING: ICD-10-CM

## 2021-07-20 ENCOUNTER — HOSPITAL ENCOUNTER (OUTPATIENT)
Dept: MRI IMAGING | Age: 61
Discharge: HOME | End: 2021-07-22
Payer: COMMERCIAL

## 2021-07-20 DIAGNOSIS — R92.2 DENSE BREAST TISSUE ON MAMMOGRAM: ICD-10-CM

## 2021-07-20 DIAGNOSIS — Z80.3 FAMILY HISTORY OF BREAST CANCER IN FIRST DEGREE RELATIVE: ICD-10-CM

## 2021-07-20 DIAGNOSIS — Z80.0 FAMILY HISTORY OF COLON CANCER IN MOTHER: ICD-10-CM

## 2021-07-20 LAB
CREAT SERPL-MCNC: 0.7 MG/DL (ref 0.5–0.9)
GFR AFRICAN AMERICAN: >60 ML/MIN
GFR NON-AFRICAN AMERICAN: >60 ML/MIN
GFR SERPL CREATININE-BSD FRML MDRD: NORMAL ML/MIN/{1.73_M2}
GFR SERPL CREATININE-BSD FRML MDRD: NORMAL ML/MIN/{1.73_M2}

## 2021-07-20 PROCEDURE — 6360000004 HC RX CONTRAST MEDICATION: Performed by: OBSTETRICS & GYNECOLOGY

## 2021-07-20 PROCEDURE — 36415 COLL VENOUS BLD VENIPUNCTURE: CPT

## 2021-07-20 PROCEDURE — 82565 ASSAY OF CREATININE: CPT

## 2021-07-20 PROCEDURE — 77049 MRI BREAST C-+ W/CAD BI: CPT

## 2021-07-20 PROCEDURE — A9579 GAD-BASE MR CONTRAST NOS,1ML: HCPCS | Performed by: OBSTETRICS & GYNECOLOGY

## 2021-07-20 RX ORDER — SODIUM CHLORIDE 0.9 % (FLUSH) 0.9 %
20 SYRINGE (ML) INJECTION PRN
Status: DISCONTINUED | OUTPATIENT
Start: 2021-07-20 | End: 2021-07-23 | Stop reason: HOSPADM

## 2021-07-20 RX ADMIN — GADOTERIDOL 19 ML: 279.3 INJECTION, SOLUTION INTRAVENOUS at 11:38

## 2021-09-22 ENCOUNTER — HOSPITAL ENCOUNTER (OUTPATIENT)
Dept: GENERAL RADIOLOGY | Age: 61
Discharge: HOME | End: 2021-09-24
Payer: COMMERCIAL

## 2021-09-22 ENCOUNTER — HOSPITAL ENCOUNTER (OUTPATIENT)
Age: 61
Discharge: HOME | End: 2021-09-24
Payer: COMMERCIAL

## 2021-09-22 DIAGNOSIS — M25.511 RIGHT SHOULDER PAIN, UNSPECIFIED CHRONICITY: ICD-10-CM

## 2021-09-22 PROCEDURE — 73030 X-RAY EXAM OF SHOULDER: CPT

## 2021-10-11 ENCOUNTER — APPOINTMENT (OUTPATIENT)
Dept: CT IMAGING | Age: 61
End: 2021-10-11
Payer: COMMERCIAL

## 2021-10-11 ENCOUNTER — HOSPITAL ENCOUNTER (EMERGENCY)
Age: 61
Discharge: HOME | End: 2021-10-11
Attending: EMERGENCY MEDICINE
Payer: COMMERCIAL

## 2021-10-11 VITALS
HEART RATE: 74 BPM | RESPIRATION RATE: 16 BRPM | SYSTOLIC BLOOD PRESSURE: 160 MMHG | HEIGHT: 62 IN | BODY MASS INDEX: 34.04 KG/M2 | TEMPERATURE: 98.6 F | DIASTOLIC BLOOD PRESSURE: 86 MMHG | OXYGEN SATURATION: 94 % | WEIGHT: 185 LBS

## 2021-10-11 DIAGNOSIS — K52.9 COLITIS: Primary | ICD-10-CM

## 2021-10-11 DIAGNOSIS — R19.7 DIARRHEA, UNSPECIFIED TYPE: ICD-10-CM

## 2021-10-11 LAB
-: ABNORMAL
ABSOLUTE EOS #: 0.09 K/UL (ref 0–0.44)
ABSOLUTE IMMATURE GRANULOCYTE: 0.06 K/UL (ref 0–0.3)
ABSOLUTE LYMPH #: 2.39 K/UL (ref 1.1–3.7)
ABSOLUTE MONO #: 1.12 K/UL (ref 0.1–1.2)
AMORPHOUS: ABNORMAL
ANION GAP SERPL CALCULATED.3IONS-SCNC: 12 MMOL/L (ref 9–17)
BACTERIA: ABNORMAL
BASOPHILS # BLD: 1 % (ref 0–2)
BASOPHILS ABSOLUTE: 0.06 K/UL (ref 0–0.2)
BILIRUBIN URINE: NEGATIVE
BUN BLDV-MCNC: 12 MG/DL (ref 8–23)
BUN/CREAT BLD: ABNORMAL (ref 9–20)
CALCIUM SERPL-MCNC: 9.2 MG/DL (ref 8.6–10.4)
CASTS UA: ABNORMAL /LPF (ref 0–2)
CASTS UA: ABNORMAL /LPF (ref 0–2)
CHLORIDE BLD-SCNC: 103 MMOL/L (ref 98–107)
CO2: 25 MMOL/L (ref 20–31)
COLOR: ABNORMAL
COMMENT UA: ABNORMAL
CREAT SERPL-MCNC: 0.72 MG/DL (ref 0.5–0.9)
CRYSTALS, UA: ABNORMAL /HPF
DIFFERENTIAL TYPE: ABNORMAL
EOSINOPHILS RELATIVE PERCENT: 1 % (ref 1–4)
EPITHELIAL CELLS UA: ABNORMAL /HPF (ref 0–5)
GFR AFRICAN AMERICAN: >60 ML/MIN
GFR NON-AFRICAN AMERICAN: >60 ML/MIN
GFR SERPL CREATININE-BSD FRML MDRD: ABNORMAL ML/MIN/{1.73_M2}
GFR SERPL CREATININE-BSD FRML MDRD: ABNORMAL ML/MIN/{1.73_M2}
GLUCOSE BLD-MCNC: 103 MG/DL (ref 70–99)
GLUCOSE URINE: NEGATIVE
HCT VFR BLD CALC: 42.6 % (ref 36.3–47.1)
HEMOGLOBIN: 13.3 G/DL (ref 11.9–15.1)
IMMATURE GRANULOCYTES: 1 %
KETONES, URINE: ABNORMAL
LEUKOCYTE ESTERASE, URINE: ABNORMAL
LYMPHOCYTES # BLD: 19 % (ref 24–43)
MCH RBC QN AUTO: 27.8 PG (ref 25.2–33.5)
MCHC RBC AUTO-ENTMCNC: 31.2 G/DL (ref 28.4–34.8)
MCV RBC AUTO: 89.1 FL (ref 82.6–102.9)
MONOCYTES # BLD: 9 % (ref 3–12)
MUCUS: ABNORMAL
NITRITE, URINE: NEGATIVE
NRBC AUTOMATED: 0 PER 100 WBC
OTHER OBSERVATIONS UA: ABNORMAL
PDW BLD-RTO: 12.5 % (ref 11.8–14.4)
PH UA: 5 (ref 5–8)
PLATELET # BLD: ABNORMAL K/UL (ref 138–453)
PLATELET ESTIMATE: ABNORMAL
PLATELET, FLUORESCENCE: NORMAL K/UL (ref 138–453)
PLATELET, IMMATURE FRACTION: NORMAL % (ref 1.1–10.3)
PMV BLD AUTO: ABNORMAL FL (ref 8.1–13.5)
POTASSIUM SERPL-SCNC: 4.1 MMOL/L (ref 3.7–5.3)
PROTEIN UA: NEGATIVE
RBC # BLD: 4.78 M/UL (ref 3.95–5.11)
RBC # BLD: ABNORMAL 10*6/UL
RBC UA: ABNORMAL /HPF (ref 0–2)
RENAL EPITHELIAL, UA: ABNORMAL /HPF
SEG NEUTROPHILS: 70 % (ref 36–65)
SEGMENTED NEUTROPHILS ABSOLUTE COUNT: 8.84 K/UL (ref 1.5–8.1)
SODIUM BLD-SCNC: 140 MMOL/L (ref 135–144)
SPECIFIC GRAVITY UA: 1.03 (ref 1–1.03)
TRICHOMONAS: ABNORMAL
TSH SERPL DL<=0.05 MIU/L-ACNC: 1.65 MIU/L (ref 0.3–5)
TURBIDITY: ABNORMAL
URINE HGB: ABNORMAL
UROBILINOGEN, URINE: NORMAL
WBC # BLD: 12.6 K/UL (ref 3.5–11.3)
WBC # BLD: ABNORMAL 10*3/UL
WBC UA: ABNORMAL /HPF (ref 0–5)
YEAST: ABNORMAL

## 2021-10-11 PROCEDURE — 99282 EMERGENCY DEPT VISIT SF MDM: CPT

## 2021-10-11 PROCEDURE — 84443 ASSAY THYROID STIM HORMONE: CPT

## 2021-10-11 PROCEDURE — 85055 RETICULATED PLATELET ASSAY: CPT

## 2021-10-11 PROCEDURE — 87086 URINE CULTURE/COLONY COUNT: CPT

## 2021-10-11 PROCEDURE — 80048 BASIC METABOLIC PNL TOTAL CA: CPT

## 2021-10-11 PROCEDURE — 86403 PARTICLE AGGLUT ANTBDY SCRN: CPT

## 2021-10-11 PROCEDURE — 85025 COMPLETE CBC W/AUTO DIFF WBC: CPT

## 2021-10-11 PROCEDURE — 6360000004 HC RX CONTRAST MEDICATION: Performed by: EMERGENCY MEDICINE

## 2021-10-11 PROCEDURE — 81001 URINALYSIS AUTO W/SCOPE: CPT

## 2021-10-11 PROCEDURE — 74177 CT ABD & PELVIS W/CONTRAST: CPT

## 2021-10-11 RX ORDER — MOXIFLOXACIN HYDROCHLORIDE 400 MG/1
400 TABLET ORAL DAILY
Qty: 5 TABLET | Refills: 0 | Status: SHIPPED | OUTPATIENT
Start: 2021-10-11 | End: 2021-10-16

## 2021-10-11 RX ORDER — ONDANSETRON 4 MG/1
4 TABLET, ORALLY DISINTEGRATING ORAL EVERY 8 HOURS PRN
Qty: 20 TABLET | Refills: 0 | Status: SHIPPED | OUTPATIENT
Start: 2021-10-11

## 2021-10-11 RX ADMIN — IOPAMIDOL 75 ML: 755 INJECTION, SOLUTION INTRAVENOUS at 11:43

## 2021-10-11 ASSESSMENT — ENCOUNTER SYMPTOMS
DIARRHEA: 1
BLOOD IN STOOL: 1
ABDOMINAL PAIN: 1
SORE THROAT: 0
VOMITING: 0
COUGH: 0
NAUSEA: 0
SHORTNESS OF BREATH: 0
RHINORRHEA: 0

## 2021-10-11 ASSESSMENT — PAIN SCALES - GENERAL: PAINLEVEL_OUTOF10: 6

## 2021-10-11 ASSESSMENT — PAIN DESCRIPTION - LOCATION: LOCATION: ABDOMEN

## 2021-10-11 ASSESSMENT — PAIN DESCRIPTION - PAIN TYPE: TYPE: ACUTE PAIN

## 2021-10-11 ASSESSMENT — PAIN DESCRIPTION - PROGRESSION: CLINICAL_PROGRESSION: NOT CHANGED

## 2021-10-11 ASSESSMENT — PAIN DESCRIPTION - ONSET: ONSET: ON-GOING

## 2021-10-11 ASSESSMENT — PAIN DESCRIPTION - ORIENTATION: ORIENTATION: LOWER;MID

## 2021-10-11 ASSESSMENT — PAIN DESCRIPTION - DESCRIPTORS: DESCRIPTORS: DULL

## 2021-10-11 ASSESSMENT — PAIN DESCRIPTION - FREQUENCY: FREQUENCY: CONTINUOUS

## 2021-10-11 NOTE — ED PROVIDER NOTES
[facility] ED  Emergency Department Encounter  EmergencyMedicine Resident     Pt Name:Lindy Benitez  MRN: [id number]  [place] 1960  Date of evaluation: 10/11/21  PCP:  Melanie Conklin MD    This patient was evaluated in the Emergency Department for symptoms described in the history of present illness. The patient was evaluated in the context of the global COVID-19 pandemic, which necessitated consideration that the patient might be at risk for infection with the SARS-CoV-2 virus that causes COVID-19. Institutional protocols and algorithms that pertain to the evaluation of patients at risk for COVID-19 are in a state of rapid change based on information released by regulatory bodies including the CDC and federal and state organizations. These policies and algorithms were followed during the patient's care in the ED. CHIEF COMPLAINT       Chief Complaint   Patient presents with    Hematochezia     bright red, x1 day    Abdominal Pain     lower abd, x 1 day    Diarrhea     states it has subsided, x2 episodes last night       HISTORY OF PRESENT ILLNESS  (Location/Symptom, Timing/Onset, Context/Setting, Quality, Duration, Modifying Factors, Severity.)      Yaya Guzmán is a 57 y.o. female who presents with diarrhea with some bright red blood x 2 days. She has had two episodes of diarrhea with bright red blood. She has been having episodes of chills and sweats, but no fevers. She has a history of thyroid disease. She is concerned because she states she has a strong family history of cancer. Most recent colonoscopy 2 years ago showed some early onset diverticulosis per patient. EMR does not show these records, but patient stated she was not placed on any antibiotics, so likely not diverticulitis. She states she is never had rectal bleeding before, although she states she \"believes she has hemorrhoids\". She is vaccinated for Covid.   She endorses some increased family stress over the past week. No change in p.o. intake, patient states she had [place] food yesterday. No fevers, chest pain, shortness of breath, urinary symptoms, black or tarry stools, fecal incontinence or retention, saddle anesthesia. PAST MEDICAL / SURGICAL / SOCIAL / FAMILY HISTORY      has a past medical history of Mitral valve prolapse and Thyroid disease. has a past surgical history that includes sinus surgery; Tonsillectomy; Pilonidal cyst excision; Ovary removal (Right); and sinus surgery (11/14/14). Social History     Socioeconomic History    Marital status:      Spouse name: Not on file    Number of children: Not on file    Years of education: Not on file    Highest education level: Not on file   Occupational History    Not on file   Tobacco Use    Smoking status: Never Smoker    Smokeless tobacco: Never Used   Substance and Sexual Activity    Alcohol use: Yes     Comment: occasionally    Drug use: No    Sexual activity: Yes     Partners: Male   Other Topics Concern    Not on file   Social History Narrative    Not on file     Social Determinants of Health     Financial Resource Strain:     Difficulty of Paying Living Expenses:    Food Insecurity:     Worried About Running Out of Food in the Last Year:     [address] in the Last Year:    Transportation Needs:     Lack of Transportation (Medical):      Lack of Transportation (Non-Medical):    Physical Activity:     Days of Exercise per Week:     Minutes of Exercise per Session:    Stress:     Feeling of Stress :    Social Connections:     Frequency of Communication with Friends and Family:     Frequency of Social Gatherings with Friends and Family:     Attends Oriental orthodox Services:     Active Member of Clubs or Organizations:     Attends Club or Organization Meetings:     Marital Status:    Intimate Partner Violence:     Fear of Current or Ex-Partner:     Emotionally Abused:     Physically Abused:     Sexually Abused: Family History   Problem Relation Age of Onset    Breast Cancer Mother     Lung Cancer Father     Colon Cancer Paternal Grandmother     Lung Cancer Brother     Colon Cancer Brother     Colon Cancer Sister        Allergies:  Codeine and Demerol hcl [meperidine]    Home Medications:  Prior to Admission medications    Medication Sig Start Date End Date Taking? Authorizing Provider   moxifloxacin (AVELOX) 400 MG tablet Take 1 tablet by mouth daily for 5 days 10/11/21 10/16/21 Yes Blanche Veras MD   ondansetron (ZOFRAN ODT) 4 MG disintegrating tablet Take 1 tablet by mouth every 8 hours as needed for Nausea 10/11/21  Yes Blanche Veras MD   Omega-3 Fatty Acids (FISH OIL) 1000 MG CAPS Take 3,000 mg by mouth 3 times daily    Historical Provider, MD   calcipotriene (DOVONEX) 0.005 % cream  1/15/20   Historical Provider, MD   ibuprofen (IBU) 800 MG tablet Take 1 tablet by mouth every 6 hours as needed for Pain  Patient not taking: Reported on 4/15/2021 10/12/19   Anne Ross DO   BIOTIN PO Take by mouth    Historical Provider, MD   levothyroxine (SYNTHROID) 100 MCG tablet 1 tablet 11/3/16   Historical Provider, MD   fluticasone (FLONASE) 50 MCG/ACT nasal spray 2 sprays by Nasal route daily. Historical Provider, MD   vitamin E 400 UNIT capsule Take 1,600 Units by mouth daily. Historical Provider, MD   Ascorbic Acid (VITAMIN C GUMMIE PO) Take 2 tablets by mouth daily. Historical Provider, MD       REVIEW OF SYSTEMS    (2-9 systems for level 4, 10 or more for level 5)      Review of Systems   Constitutional: Negative for activity change, appetite change and fever. HENT: Negative for congestion, rhinorrhea and sore throat. Eyes: Negative for visual disturbance. Respiratory: Negative for cough and shortness of breath. Cardiovascular: Negative for chest pain and palpitations.    Gastrointestinal: Positive for abdominal pain (Diffuse abdominal pain), blood in stool and diarrhea  Culture, Urine    CT ABDOMEN PELVIS W IV CONTRAST Additional Contrast? None    CBC WITH AUTO DIFFERENTIAL    BASIC METABOLIC PANEL    TSH with Reflex    Immature Platelet Fraction    Urinalysis Reflex to Culture    Microscopic Urinalysis       MEDICATIONS ORDERED:  Orders Placed This Encounter   Medications    iopamidol (ISOVUE-370) 76 % injection 75 mL    moxifloxacin (AVELOX) 400 MG tablet     Sig: Take 1 tablet by mouth daily for 5 days     Dispense:  5 tablet     Refill:  0    ondansetron (ZOFRAN ODT) 4 MG disintegrating tablet     Sig: Take 1 tablet by mouth every 8 hours as needed for Nausea     Dispense:  20 tablet     Refill:  0     DDX: COVID-19 versus upper GI bleed versus lower GI bleed versus diverticulosis versus diverticulitis versus colitis versus SBO versus infectious diarrhea    DIAGNOSTIC RESULTS / EMERGENCY DEPARTMENT COURSE / MDM   LAB RESULTS:  Results for orders placed or performed during the hospital encounter of 10/11/21   CBC WITH AUTO DIFFERENTIAL   Result Value Ref Range    WBC 12.6 (H) 3.5 - 11.3 k/uL    RBC 4.78 3.95 - 5.11 m/uL    Hemoglobin 13.3 11.9 - 15.1 g/dL    Hematocrit 42.6 36.3 - 47.1 %    MCV 89.1 82.6 - 102.9 fL    MCH 27.8 25.2 - 33.5 pg    MCHC 31.2 28.4 - 34.8 g/dL    RDW 12.5 11.8 - 14.4 %    Platelets See Reflexed IPF Result 138 - 453 k/uL    MPV NOT REPORTED 8.1 - 13.5 fL    NRBC Automated 0.0 0.0 per 100 WBC    Differential Type NOT REPORTED     WBC Morphology NOT REPORTED     RBC Morphology NOT REPORTED     Platelet Estimate NOT REPORTED     Seg Neutrophils 70 (H) 36 - 65 %    Lymphocytes 19 (L) 24 - 43 %    Monocytes 9 3 - 12 %    Eosinophils % 1 1 - 4 %    Basophils 1 0 - 2 %    Immature Granulocytes 1 (H) 0 %    Segs Absolute 8.84 (H) 1.50 - 8.10 k/uL    Absolute Lymph # 2.39 1.10 - 3.70 k/uL    Absolute Mono # 1.12 0.10 - 1.20 k/uL    Absolute Eos # 0.09 0.00 - 0.44 k/uL    Basophils Absolute 0.06 0.00 - 0.20 k/uL    Absolute Immature Granulocyte some WBC    RADIOLOGY:  CT ABDOMEN PELVIS W IV CONTRAST Additional Contrast? None    Result Date: 10/11/2021  EXAMINATION: CT OF THE ABDOMEN AND PELVIS WITH CONTRAST 10/11/2021 11:01 am TECHNIQUE: CT of the abdomen and pelvis was performed with the administration of intravenous contrast. Multiplanar reformatted images are provided for review. Dose modulation, iterative reconstruction, and/or weight based adjustment of the mA/kV was utilized to reduce the radiation dose to as low as reasonably achievable. COMPARISON: None. HISTORY: ORDERING SYSTEM PROVIDED HISTORY: Abdominal pain, hemoccult positive, leukocytosis TECHNOLOGIST PROVIDED HISTORY: Abdominal pain, hemoccult positive, leukocytosis Decision Support Exception - unselect if not a suspected or confirmed emergency medical condition->Emergency Medical Condition (MA) FINDINGS: There is wall thickening and pericolonic fat stranding in the mid to distal transverse colon. There is no evidence of perforation or pneumatosis intestinalis. No evidence of portal venous gas. The liver, spleen, pancreas, and adrenal glands are unremarkable. There is excreted contrast within the renal collecting system bilaterally. No evidence of hydronephrosis or renal cortical lesion. The gallbladder is intact without evidence of biliary ductal dilatation. There is no evidence of bowel obstruction, pneumoperitoneum, or ascites. The uterus and adnexa are unremarkable. There is colonic diverticulosis without evidence of diverticulitis. The appendix is unremarkable in appearance. There are bilateral fat containing inguinal hernias without herniation of bowel. There is fat containing umbilical hernia. There is small hiatal hernia. There is arteriosclerosis without abdominal aortic aneurysm. There is linear atelectasis and/or scarring within the bilateral lung bases. There are degenerative changes within the spine.      1. Wall thickening and pericolonic fat stranding in the mid to distal transverse colon may represent infectious or inflammatory colitis. Ischemic colitis is less likely. 2. Colonic diverticulosis without evidence of diverticulitis. 3. Small hiatal hernia. EMERGENCY DEPARTMENT COURSE:  ED Course as of Oct 11 1609   Mon Oct 11, 2021   1011 Hemoccult positive    [JG]   1012 WBC(!): 12.6 [JG]   1034 + epigastric, RLQ, LLQ and LUQ tenderness on palpation. With leukocytosis, positive hemoccult, hx of possible diverticulosis, and elevated BP without a history of HTN, will go ahead and get CT Abd/Pelvis    [JG]   1102 CT abdomen pelvis in progress    [JG]   1205 CT abdomen pelvis showing wall thickening and pericolonic fat stranding suspicious for infectious or inflammatory colitis. Redemonstrating colonic diverticulosis that patient states was present on her colonoscopy, without evidence of diverticulitis. Small hiatal hernia noted. Attempted to reach patient's PCP to determine if PCP would prefer patient to be placed on antibiotics, but was told PCP is out of the office today without coverage. Started patient on clindamycin, with follow-up with her PCP in the next 24 to 48 hours for repeat abdominal exam.  Discussed return precautions, including worsening abdominal pain, black or tarry stools, increasing rectal bleeding, lightheadedness, dizziness, syncope, increasing pallor, or other concerns. Patient voiced understanding, and is comfortable discharge at this time    [JG]      ED Course User Index  [JG] Vivien Guevara MD     CONSULTS:  None    FINAL IMPRESSION      1. Colitis    2.  Diarrhea, unspecified type          DISPOSITION / PLAN     DISPOSITION Decision To Discharge 10/11/2021 12:33:51 PM      PATIENT REFERRED TO:  Cristela Cotton MD  [address] [place] [address]  [phone]    Schedule an appointment as soon as possible for a visit in 2 days      [facility] ED  [address]  Go to If symptoms worsen      DISCHARGE MEDICATIONS:  Discharge Medication List as of 10/11/2021 12:56 PM      START taking these medications    Details   moxifloxacin (AVELOX) 400 MG tablet Take 1 tablet by mouth daily for 5 days, Disp-5 tablet, R-0Print      ondansetron (ZOFRAN ODT) 4 MG disintegrating tablet Take 1 tablet by mouth every 8 hours as needed for Nausea, Disp-20 tablet, R-0Print             Ira Enriquez MD  Emergency Medicine Resident    (Please note that portions of thisnote were completed with a voice recognition program.  Efforts were made to edit the dictations but occasionally words are mis-transcribed.)       Ira Enriquez MD  Resident  10/11/21 1062

## 2021-10-11 NOTE — ED PROVIDER NOTES
[facility]     Emergency Department     Faculty Note/ Attestation      Pt Name: Rubin Delgado                                       MRN: [id number]  [place] 1960  Date of evaluation: 10/11/2021  Patients PCP:    Anca Lim MD    Attestation  I performed a history and physical examination of the patient/ or directly observed  and discussed management with the resident. I reviewed the residents note and agree with the documented findings and plan of care. Any areas of disagreement are noted on the chart. I was personally present for the key portions of any procedures. I have documented in the chart those procedures where I was not present during the key portions. I have reviewed the emergency nurses triage note. I agree with the chief complaint, past medical history, past surgical history, allergies, medications, social and family history as documented unless otherwise noted below. For Physician Assistant/ Nurse Practitioner cases/documentation I have personally evaluated this patient and have completed at least one if not all key elements of the E/M (history, physical exam, and MDM). Additional findings are as noted. This patient was evaluated in the Emergency Department for symptoms described in the history of present illness. The patient was evaluated in the context of the global COVID-19 pandemic, which necessitated consideration that the patient might be at risk for infection with the SARS-CoV-2 virus that causes COVID-19. Institutional protocols and algorithms that pertain to the evaluation of patients at risk for COVID-19 are in a state of rapid change based on information released by regulatory bodies including the CDC and federal and state organizations. These policies and algorithms were followed during the patient's care in the ED.      Initial Screens:             Vitals:    Vitals:    10/11/21 0926   BP: (!) 160/86   Pulse: 74   Resp: 16   Temp: 98.6 °F (37 °C)   TempSrc: Oral   SpO2: 94%   Weight: 185 lb (83.9 kg)   Height: 5' 2\" (1.575 m)       Chief Complaint      Chief Complaint   Patient presents with    Hematochezia     bright red, x1 day    Abdominal Pain     lower abd, x 1 day    Diarrhea     states it has subsided, x2 episodes last night          height is 5' 2\" (1.575 m) and weight is 185 lb (83.9 kg). Her oral temperature is 98.6 °F (37 °C). Her blood pressure is 160/86 (abnormal) and her pulse is 74. Her respiration is 16 and oxygen saturation is 94%. DIAGNOSTIC RESULTS       RADIOLOGY:   No orders to display         LABS:  Labs Reviewed   CBC WITH AUTO DIFFERENTIAL   BASIC METABOLIC PANEL   TSH WITH REFLEX         EMERGENCY DEPARTMENT COURSE:     -------------------------      BP: (!) 160/86, Temp: 98.6 °F (37 °C), Pulse: 74, Resp: 16    System Problem List     Patient Active Problem List   Diagnosis    Chronic sinusitis         Comments  Chronic Prob List noted          Patricia Ledezma MD,, MD, F.A.C.E.P.   Attending Emergency Physician         Patricia Ledezma MD  10/11/21 5605

## 2021-10-11 NOTE — ED NOTES
MD AT BEDSIDE RE RECTAL EXAM WHICH SHE GOT POSITIVE FOR BLOOD.  Patient informed of results     Maxim Mixon RN  10/11/21 7852

## 2021-10-12 LAB
CULTURE: ABNORMAL
Lab: ABNORMAL
SPECIMEN DESCRIPTION: ABNORMAL

## 2021-10-13 NOTE — PROGRESS NOTES
Reviewed patient's urine culture - culture positive for Beta hemolytic Streptococci. Patient was discharged on moxifloxacin for colitis, not cystitis. Patient is not pregnant, was not complaining of urinary symptoms, and does not require additional antimicrobials for urine culture results. No changes made to regimen.     Thank you,  Siomara Harris, PharmD  10/13/2021  9:55 AM

## 2021-10-28 ENCOUNTER — HOSPITAL ENCOUNTER (OUTPATIENT)
Dept: MRI IMAGING | Age: 61
Discharge: HOME | End: 2021-10-30
Payer: COMMERCIAL

## 2021-10-28 DIAGNOSIS — M75.101 TEAR OF RIGHT ROTATOR CUFF, UNSPECIFIED TEAR EXTENT, UNSPECIFIED WHETHER TRAUMATIC: ICD-10-CM

## 2021-10-28 PROCEDURE — 73221 MRI JOINT UPR EXTREM W/O DYE: CPT

## 2022-03-29 ENCOUNTER — HOSPITAL ENCOUNTER (OUTPATIENT)
Age: 62
Setting detail: SPECIMEN
Discharge: HOME | End: 2022-03-29

## 2022-03-30 LAB
CHOLESTEROL/HDL RATIO: 3.3
CHOLESTEROL: 235 MG/DL
GLUCOSE BLD-MCNC: 92 MG/DL (ref 70–99)
HDLC SERPL-MCNC: 71 MG/DL
LDL CHOLESTEROL: 144 MG/DL (ref 0–130)
TRIGL SERPL-MCNC: 98 MG/DL

## 2022-10-06 DIAGNOSIS — Z12.31 VISIT FOR SCREENING MAMMOGRAM: Primary | ICD-10-CM

## 2022-11-10 ENCOUNTER — HOSPITAL ENCOUNTER (OUTPATIENT)
Dept: MAMMOGRAPHY | Age: 62
Discharge: HOME | End: 2022-11-12
Payer: COMMERCIAL

## 2022-11-10 DIAGNOSIS — Z12.31 VISIT FOR SCREENING MAMMOGRAM: ICD-10-CM

## 2022-11-10 PROCEDURE — 77067 SCR MAMMO BI INCL CAD: CPT

## 2022-11-20 NOTE — PATIENT INSTRUCTIONS
Please get your bone density x-ray done at your convenience. We will contact you with that result as well as today's Pap smear. Return to the office in 1 year or as needed. Happy holidays and have a fantastic year!   GO BLUE!!!

## 2022-11-20 NOTE — PROGRESS NOTES
[facility] [place]  [address] [facility] [address]       DATE OF VISIT:  22        History and Physical    Jhoana Fishman    :  1960  CHIEF COMPLAINT:    Chief Complaint   Patient presents with    Annual Exam     Here for annual last pap 04/15/21 neg Last mammogram 11/10/22                    HPI :   Jhoana Fishman is a 60 y.o. femaleGRAVIDA 3 [address]003   Ishaan Lora MD      Jhoana Fishman returns today for her annual exam.  She recently saw her PCP and has some blood work pending. Recent mammogram was negative and she is up-to-date on her COVID vaccinations. She has not had a DEXA scan. She is having regular bowel movements without constipation or diarrhea. She denies any involuntary loss of urine. She is otherwise without any significant complaints today.   Maurilio Jahaira is still working for [employer].  _____________________________________________________________________  Past Medical History:   Diagnosis Date    Mitral valve prolapse     Thyroid disease                                                                    Past Surgical History:   Procedure Laterality Date    OVARY REMOVAL Right     PILONIDAL CYST EXCISION      SINUS SURGERY      SINUS SURGERY  13    TONSILLECTOMY       Family History   Problem Relation Age of Onset    Breast Cancer Mother     Lung Cancer Father     Colon Cancer Paternal Grandmother     Lung Cancer Brother     Colon Cancer Brother     Colon Cancer Sister      Social History     Tobacco Use   Smoking Status Never   Smokeless Tobacco Never     Social History     Substance and Sexual Activity   Alcohol Use Yes    Comment: occasionally     Current Outpatient Medications   Medication Sig Dispense Refill    Omega-3 Fatty Acids (FISH OIL) 1000 MG CAPS Take 3,000 mg by mouth 3 times daily      calcipotriene (DOVONEX) 0.005 % cream       BIOTIN PO Take by mouth      levothyroxine (SYNTHROID) 100 MCG tablet 1 tablet      fluticasone (FLONASE) 50 MCG/ACT nasal spray 2 sprays by Nasal route daily. vitamin E 400 UNIT capsule Take 1,600 Units by mouth daily. Ascorbic Acid (VITAMIN C GUMMIE PO) Take 2 tablets by mouth daily. ondansetron (ZOFRAN ODT) 4 MG disintegrating tablet Take 1 tablet by mouth every 8 hours as needed for Nausea (Patient not taking: Reported on 2022) 20 tablet 0    ibuprofen (IBU) 800 MG tablet Take 1 tablet by mouth every 6 hours as needed for Pain (Patient not taking: No sig reported) 21 tablet 0     No current facility-administered medications for this visit. Allergies: Allergies   Allergen Reactions    Codeine Nausea Only    Demerol Hcl [Meperidine] Nausea Only       Gynecologic History:  Patient's last menstrual period was 2014 (within weeks).   Sexually Active: Yes  STD History: No  Abnormal Pap History yes  Birth Control: No    OB History    Para Term  AB Living   3 3 3 0 0 3   SAB IAB Ectopic Molar Multiple Live Births   0 0 0 0 0 0     ______________________________________________________________________  REVIEW OF SYSTEMS:        Constitutional:  Unexpected weight change no  Neurological:  Frequent headaches  no  Ophthalmic:  Recent visual changes no  ENT:   Difficulty swallowing  no  Breast:              Masses   no     Respiratory:  Shortness of breath  no    Cardiovascular: Chest pain   no     Gastrointestinal: Chronic diarrhea/constipation no   Urogenital:  Urinary incontinence  no                                         Heavy/irregular periods           no                                      Vaginal discharge                   no  Hematological: Bruises easy   no     Endocrine:  Nipple Discharge  no     Hot/Cold Intolerance  no   Psychological:  Mood and affect were wnl yes Physical Exam:    Vitals:    11/22/22 1541   BP: 122/78   Site: Right Upper Arm   Position: Sitting   Cuff Size: Large Adult   Weight: 185 lb (83.9 kg)       General Appearance:  She does not appear to be in any distress. This  is a well developed, well nourished, well groomed female. Neurological:  The patient is alert and oriented to time, place, person, and situation without any noted sensory motor deficits. Skin:  A brief inspection of the skin revealed no rashes or lesions. Neck:  The neck was supple. There is no tracheal deviation, thyromegaly or supraclavicular adenopathy appreciated. Breast:   The patients breasts were symmetrical.  Breasts are nontender and there  were no masses, discharge or pathologic skin changes. There is no supraclavicular or axillary adenopathy bilaterally. Respiratory: There was unlabored respiratory effort. Lungs clear to ascultation without wheezes, rales or rhonchi in all fields bilaterally. Cardiovascular:  Normal sinus rhythm with a regular rate and without murmur, rubs or gallops. Abdomen: The abdomen was soft and non-tender with no guarding, rebound, CVAT or rigidity. No hernias were appreciated. Bowel sounds were normally active. Pelvic exam:  No vulvar, vaginal or cervical lesions are noted. Normal vaginal discharge present, second-degree cystocele, small rectocele and enterocele noted. Uterus with 1-2 degree prolapse, is nongravid and without CMT and adnexa nontender and without abnormal masses bilaterally. Rectum without external lesions noted. Extremities:  FROM and nontender without clubbing cyanosis or edema. ASSESSMENT:         ICD-10-CM    1. Encounter for gynecological examination without abnormal finding  Z01.419       2. Screening for osteoporosis  Z13.820       3. Family history of breast cancer in first degree relative  Z80.3       4.  Family history of colon cancer in mother  [de-identified]                       PLAN:  If Negative Cytology, Follow-up screening per current guidelines. We will discontinue surveillance. Mammograms every 1year. If 35 yo and last mammogram was negative. Mixed incontinence - discussed bladder training and kegel exercises. Info given. Calcium and Vitamin D dosing reviewed. Colonoscopy screening reviewed as well as onset for bone density testing. Birth control and barrier recommendations discussed. STD counseling and prevention reviewed. Routine health maintenance per patients PCP. Return to the office in 1 year or when necessary  Patient was seen with total face to face time of 20 minutes. Vernon Gagnon M.D., [address] [facility] ,[address] [facility]. [address] [facility]  Jose

## 2022-11-22 ENCOUNTER — HOSPITAL ENCOUNTER (OUTPATIENT)
Age: 62
Setting detail: SPECIMEN
Discharge: HOME | End: 2022-11-22

## 2022-11-22 ENCOUNTER — OFFICE VISIT (OUTPATIENT)
Dept: OBGYN CLINIC | Age: 62
End: 2022-11-22
Payer: COMMERCIAL

## 2022-11-22 VITALS — BODY MASS INDEX: 33.84 KG/M2 | DIASTOLIC BLOOD PRESSURE: 78 MMHG | SYSTOLIC BLOOD PRESSURE: 122 MMHG | WEIGHT: 185 LBS

## 2022-11-22 DIAGNOSIS — Z80.3 FAMILY HISTORY OF BREAST CANCER IN FIRST DEGREE RELATIVE: ICD-10-CM

## 2022-11-22 DIAGNOSIS — Z01.419 ENCOUNTER FOR GYNECOLOGICAL EXAMINATION WITHOUT ABNORMAL FINDING: Primary | ICD-10-CM

## 2022-11-22 DIAGNOSIS — Z80.0 FAMILY HISTORY OF COLON CANCER IN MOTHER: ICD-10-CM

## 2022-11-22 DIAGNOSIS — Z13.820 SCREENING FOR OSTEOPOROSIS: ICD-10-CM

## 2022-11-22 PROCEDURE — 99396 PREV VISIT EST AGE 40-64: CPT | Performed by: OBSTETRICS & GYNECOLOGY

## 2022-11-22 ASSESSMENT — PATIENT HEALTH QUESTIONNAIRE - PHQ9
SUM OF ALL RESPONSES TO PHQ9 QUESTIONS 1 & 2: 0
SUM OF ALL RESPONSES TO PHQ QUESTIONS 1-9: 0
SUM OF ALL RESPONSES TO PHQ QUESTIONS 1-9: 0
1. LITTLE INTEREST OR PLEASURE IN DOING THINGS: 0
2. FEELING DOWN, DEPRESSED OR HOPELESS: 0
SUM OF ALL RESPONSES TO PHQ QUESTIONS 1-9: 0
SUM OF ALL RESPONSES TO PHQ QUESTIONS 1-9: 0

## 2023-04-04 ENCOUNTER — NURSE TRIAGE (OUTPATIENT)
Dept: OTHER | Facility: CLINIC | Age: 63
End: 2023-04-04

## 2023-04-04 NOTE — TELEPHONE ENCOUNTER
Location of patient: [address] call from Lawyer Ruby at [facility]; Patient with [employer] Complaint requesting to establish care with [facility]. Subjective: Caller states \"I fell this morning and hurt my wrist. My school nurse told me I need an x ray. I want to know if they have x ray at the clinic. \"     Caller stated she does not want to make a new patient appointment and will call an ED. Caller then disconnected call.

## 2023-04-06 ENCOUNTER — OFFICE VISIT (OUTPATIENT)
Dept: PRIMARY CARE CLINIC | Age: 63
End: 2023-04-06
Payer: COMMERCIAL

## 2023-04-06 VITALS
OXYGEN SATURATION: 100 % | WEIGHT: 185 LBS | TEMPERATURE: 98.6 F | SYSTOLIC BLOOD PRESSURE: 178 MMHG | HEART RATE: 59 BPM | BODY MASS INDEX: 33.84 KG/M2 | DIASTOLIC BLOOD PRESSURE: 89 MMHG

## 2023-04-06 DIAGNOSIS — M25.532 ACUTE PAIN OF LEFT WRIST: ICD-10-CM

## 2023-04-06 DIAGNOSIS — S63.502A SPRAIN OF LEFT WRIST, INITIAL ENCOUNTER: Primary | ICD-10-CM

## 2023-04-06 PROCEDURE — 99203 OFFICE O/P NEW LOW 30 MIN: CPT | Performed by: NURSE PRACTITIONER

## 2023-04-06 ASSESSMENT — ENCOUNTER SYMPTOMS
COUGH: 0
RESPIRATORY NEGATIVE: 1
WHEEZING: 0
SHORTNESS OF BREATH: 0
CHEST TIGHTNESS: 0

## 2023-04-06 NOTE — PROGRESS NOTES
Reviewed PMH, SH, and  with the patient and updated. Subjective:      Review of Systems   Constitutional:  Negative for chills, fatigue and fever. Respiratory: Negative. Negative for cough, chest tightness, shortness of breath and wheezing. Cardiovascular: Negative. Negative for chest pain. Musculoskeletal:  Positive for arthralgias (left wrist s/p fall) and joint swelling (left wrist). Skin:  Negative for rash. Neurological:  Negative for tingling and numbness. All other systems reviewed and are negative. Objective:      Physical Exam  Vitals and nursing note reviewed. Constitutional:       General: She is not in acute distress. Appearance: She is well-developed. She is not diaphoretic. HENT:      Head: Normocephalic and atraumatic. Cardiovascular:      Rate and Rhythm: Normal rate and regular rhythm. Heart sounds: Normal heart sounds. No murmur heard. Pulmonary:      Effort: Pulmonary effort is normal. No respiratory distress. Breath sounds: Normal breath sounds. No wheezing. Musculoskeletal:      Left forearm: Tenderness present. No swelling. Left wrist: Swelling (1+ edema), tenderness and bony tenderness present. No snuff box tenderness. Decreased range of motion (d/t swelling). Normal pulse. Left hand: Swelling (trace) present. No tenderness or bony tenderness. Normal range of motion. Skin:     General: Skin is warm and dry. Neurological:      Mental Status: She is alert. BP (!) 178/89   Pulse 59   Temp 98.6 °F (37 °C)   Wt 185 lb (83.9 kg)   LMP 09/05/2014 (Within Weeks)   SpO2 100%   BMI 33.84 kg/m²     XR completed in the office and reviewed. Assessment:       Diagnosis Orders   1. Sprain of left wrist, initial encounter        2. Acute pain of left wrist  XR WRIST LEFT (MIN 3 VIEWS)        Plan:      XR was negative for any acute abnormalities at this time. Ice, compression, and elevation recommended at this time.  ACE bandage

## 2024-01-04 DIAGNOSIS — N64.4 BREAST PAIN: Primary | ICD-10-CM

## 2024-01-05 ENCOUNTER — HOSPITAL ENCOUNTER (OUTPATIENT)
Dept: MAMMOGRAPHY | Age: 64
Discharge: HOME | End: 2024-01-05
Payer: COMMERCIAL

## 2024-01-05 VITALS — HEIGHT: 60 IN | BODY MASS INDEX: 37.3 KG/M2 | WEIGHT: 190 LBS

## 2024-01-05 DIAGNOSIS — N64.4 BREAST PAIN: ICD-10-CM

## 2024-01-05 DIAGNOSIS — Z12.31 VISIT FOR SCREENING MAMMOGRAM: ICD-10-CM

## 2024-01-05 PROCEDURE — 77066 DX MAMMO INCL CAD BI: CPT

## 2024-01-05 PROCEDURE — G0279 TOMOSYNTHESIS, MAMMO: HCPCS

## 2024-01-25 ENCOUNTER — HOSPITAL ENCOUNTER (OUTPATIENT)
Age: 64
Setting detail: SPECIMEN
Discharge: HOME | End: 2024-01-25

## 2024-01-25 ENCOUNTER — OFFICE VISIT (OUTPATIENT)
Dept: OBGYN CLINIC | Age: 64
End: 2024-01-25
Payer: COMMERCIAL

## 2024-01-25 VITALS
DIASTOLIC BLOOD PRESSURE: 80 MMHG | WEIGHT: 195 LBS | BODY MASS INDEX: 38.28 KG/M2 | HEIGHT: 60 IN | SYSTOLIC BLOOD PRESSURE: 124 MMHG

## 2024-01-25 DIAGNOSIS — Z13.820 SCREENING FOR OSTEOPOROSIS: ICD-10-CM

## 2024-01-25 DIAGNOSIS — Z01.419 ENCOUNTER FOR GYNECOLOGICAL EXAMINATION WITHOUT ABNORMAL FINDING: Primary | ICD-10-CM

## 2024-01-25 DIAGNOSIS — Z78.0 POSTMENOPAUSAL: ICD-10-CM

## 2024-01-25 DIAGNOSIS — Z12.31 VISIT FOR SCREENING MAMMOGRAM: ICD-10-CM

## 2024-01-25 PROCEDURE — 99396 PREV VISIT EST AGE 40-64: CPT | Performed by: OBSTETRICS & GYNECOLOGY

## 2024-01-25 ASSESSMENT — PATIENT HEALTH QUESTIONNAIRE - PHQ9
2. FEELING DOWN, DEPRESSED OR HOPELESS: 0
SUM OF ALL RESPONSES TO PHQ QUESTIONS 1-9: 0
SUM OF ALL RESPONSES TO PHQ9 QUESTIONS 1 & 2: 0
1. LITTLE INTEREST OR PLEASURE IN DOING THINGS: 0
SUM OF ALL RESPONSES TO PHQ QUESTIONS 1-9: 0

## 2024-01-25 NOTE — PROGRESS NOTES
Examination chaperoned by Chelsea Gruber.    
Maternal Aunt      Social History     Tobacco Use   Smoking Status Never   Smokeless Tobacco Never     Social History     Substance and Sexual Activity   Alcohol Use Yes    Comment: occasionally     Current Outpatient Medications   Medication Sig Dispense Refill    Turmeric (QC TUMERIC COMPLEX PO) Take by mouth      Omega-3 Fatty Acids (FISH OIL) 1000 MG CAPS Take 3 capsules by mouth 3 times daily      calcipotriene (DOVONEX) 0.005 % cream       BIOTIN PO Take by mouth      levothyroxine (SYNTHROID) 100 MCG tablet 1 tablet      vitamin E 400 UNIT capsule Take 4 capsules by mouth daily      Ascorbic Acid (VITAMIN C GUMMIE PO) Take 2 tablets by mouth daily.      ondansetron (ZOFRAN ODT) 4 MG disintegrating tablet Take 1 tablet by mouth every 8 hours as needed for Nausea (Patient not taking: Reported on 2022) 20 tablet 0    ibuprofen (IBU) 800 MG tablet Take 1 tablet by mouth every 6 hours as needed for Pain (Patient not taking: No sig reported) 21 tablet 0    fluticasone (FLONASE) 50 MCG/ACT nasal spray 2 sprays by Nasal route daily. (Patient not taking: Reported on 2023)       No current facility-administered medications for this visit.       Allergies:  Allergies   Allergen Reactions    Codeine Nausea Only    Demerol Hcl [Meperidine] Nausea Only       Gynecologic History:  Patient's last menstrual period was 2014 (within weeks).  Sexually Active: Yes  STD History: No  Abnormal Pap History yes  Birth Control: No    OB History    Para Term  AB Living   3 3 3 0 0 3   SAB IAB Ectopic Molar Multiple Live Births   0 0 0 0 0 0     ______________________________________________________________________  REVIEW OF SYSTEMS:        Constitutional:  Unexpected weight change no  Neurological:  Frequent headaches  no  Ophthalmic:  Recent visual changes no  ENT:   Difficulty swallowing  no  Breast:              Masses   no     Respiratory:  Shortness of breath  no    Cardiovascular: Chest

## 2024-01-25 NOTE — PATIENT INSTRUCTIONS
Please get your bone density x-ray done at your convenience.  We will contact you with that result as well as today's Pap smear.  Plan and return to the office in 1 year or as needed.  Happy upcoming 64th birthday, safe travels and have a wonderful 2024!

## 2024-02-24 LAB — CYTOLOGY REPORT: NORMAL

## 2024-04-02 ENCOUNTER — HOSPITAL ENCOUNTER (OUTPATIENT)
Age: 64
Setting detail: SPECIMEN
Discharge: HOME | End: 2024-04-02

## 2024-04-02 LAB
ALBUMIN SERPL-MCNC: 4.5 G/DL (ref 3.5–5.2)
ALBUMIN/GLOB SERPL: 1 {RATIO} (ref 1–2.5)
ALP SERPL-CCNC: 66 U/L (ref 35–104)
ALT SERPL-CCNC: 18 U/L (ref 10–35)
ANION GAP SERPL CALCULATED.3IONS-SCNC: 17 MMOL/L (ref 9–16)
AST SERPL-CCNC: 24 U/L (ref 10–35)
BILIRUB SERPL-MCNC: 0.3 MG/DL (ref 0–1.2)
BUN SERPL-MCNC: 16 MG/DL (ref 8–23)
CALCIUM SERPL-MCNC: 9.7 MG/DL (ref 8.6–10.4)
CHLORIDE SERPL-SCNC: 102 MMOL/L (ref 98–107)
CHOLEST SERPL-MCNC: 274 MG/DL (ref 0–199)
CHOLESTEROL/HDL RATIO: 3
CO2 SERPL-SCNC: 23 MMOL/L (ref 20–31)
CREAT SERPL-MCNC: 0.7 MG/DL (ref 0.5–0.9)
ERYTHROCYTE [DISTWIDTH] IN BLOOD BY AUTOMATED COUNT: 13.3 % (ref 11.8–14.4)
GFR SERPL CREATININE-BSD FRML MDRD: >90 ML/MIN/1.73M2
GLUCOSE SERPL-MCNC: 68 MG/DL (ref 74–99)
HCT VFR BLD AUTO: 49.1 % (ref 36.3–47.1)
HDLC SERPL-MCNC: 91 MG/DL
HGB BLD-MCNC: 15 G/DL (ref 11.9–15.1)
LDLC SERPL CALC-MCNC: 161 MG/DL (ref 0–100)
MCH RBC QN AUTO: 29.3 PG (ref 25.2–33.5)
MCHC RBC AUTO-ENTMCNC: 30.5 G/DL (ref 28.4–34.8)
MCV RBC AUTO: 95.9 FL (ref 82.6–102.9)
NRBC BLD-RTO: 0 PER 100 WBC
PLATELET # BLD AUTO: 250 K/UL (ref 138–453)
PMV BLD AUTO: 9.4 FL (ref 8.1–13.5)
POTASSIUM SERPL-SCNC: 3.7 MMOL/L (ref 3.7–5.3)
PROT SERPL-MCNC: 7.8 G/DL (ref 6.6–8.7)
RBC # BLD AUTO: 5.12 M/UL (ref 3.95–5.11)
SODIUM SERPL-SCNC: 142 MMOL/L (ref 136–145)
TRIGL SERPL-MCNC: 111 MG/DL
TSH SERPL DL<=0.05 MIU/L-ACNC: 8.64 UIU/ML (ref 0.27–4.2)
VLDLC SERPL CALC-MCNC: 22 MG/DL
WBC OTHER # BLD: 8.9 K/UL (ref 3.5–11.3)

## 2024-04-03 LAB — T4 FREE SERPL-MCNC: 1.5 NG/DL (ref 0.92–1.68)

## 2024-07-05 ENCOUNTER — HOSPITAL ENCOUNTER (OUTPATIENT)
Dept: MRI IMAGING | Age: 64
Discharge: HOME | End: 2024-07-05
Payer: COMMERCIAL

## 2024-07-05 DIAGNOSIS — M75.111 NONTRAUMATIC INCOMPLETE TEAR OF RIGHT ROTATOR CUFF: ICD-10-CM

## 2024-07-05 PROCEDURE — 73221 MRI JOINT UPR EXTREM W/O DYE: CPT

## 2024-12-16 DIAGNOSIS — N64.4 BREAST PAIN: Primary | ICD-10-CM

## 2024-12-20 ENCOUNTER — HOSPITAL ENCOUNTER (OUTPATIENT)
Dept: ULTRASOUND IMAGING | Age: 64
Discharge: HOME | End: 2024-12-22
Payer: COMMERCIAL

## 2024-12-20 ENCOUNTER — HOSPITAL ENCOUNTER (OUTPATIENT)
Dept: MAMMOGRAPHY | Age: 64
Discharge: HOME | End: 2024-12-22
Attending: OBSTETRICS & GYNECOLOGY
Payer: COMMERCIAL

## 2024-12-20 VITALS — HEIGHT: 65 IN | WEIGHT: 195 LBS | BODY MASS INDEX: 32.49 KG/M2

## 2024-12-20 DIAGNOSIS — N64.4 BREAST PAIN: ICD-10-CM

## 2024-12-20 PROCEDURE — 76642 ULTRASOUND BREAST LIMITED: CPT

## 2024-12-20 PROCEDURE — G0279 TOMOSYNTHESIS, MAMMO: HCPCS

## 2025-03-26 ENCOUNTER — TRANSCRIBE ORDERS (OUTPATIENT)
Dept: ADMINISTRATIVE | Age: 65
End: 2025-03-26

## 2025-03-26 DIAGNOSIS — R06.02 SHORTNESS OF BREATH: Primary | ICD-10-CM

## 2025-03-27 ENCOUNTER — HOSPITAL ENCOUNTER (OUTPATIENT)
Age: 65
Setting detail: SPECIMEN
Discharge: HOME | End: 2025-03-27

## 2025-03-27 LAB
ALBUMIN SERPL-MCNC: 4.1 G/DL (ref 3.5–5.2)
ALBUMIN/GLOB SERPL: 1.4 {RATIO} (ref 1–2.5)
ALP SERPL-CCNC: 71 U/L (ref 35–104)
ALT SERPL-CCNC: 38 U/L (ref 10–35)
ANION GAP SERPL CALCULATED.3IONS-SCNC: 14 MMOL/L (ref 9–16)
AST SERPL-CCNC: 35 U/L (ref 10–35)
BILIRUB SERPL-MCNC: 0.2 MG/DL (ref 0–1.2)
BUN SERPL-MCNC: 14 MG/DL (ref 8–23)
CALCIUM SERPL-MCNC: 9.6 MG/DL (ref 8.6–10.4)
CHLORIDE SERPL-SCNC: 106 MMOL/L (ref 98–107)
CHOLEST SERPL-MCNC: 232 MG/DL (ref 0–199)
CHOLESTEROL/HDL RATIO: 4.2
CO2 SERPL-SCNC: 25 MMOL/L (ref 20–31)
CREAT SERPL-MCNC: 0.7 MG/DL (ref 0.6–0.9)
GFR, ESTIMATED: >90 ML/MIN/1.73M2
GLUCOSE SERPL-MCNC: 72 MG/DL (ref 74–99)
HDLC SERPL-MCNC: 55 MG/DL
LDLC SERPL CALC-MCNC: 138 MG/DL (ref 0–100)
POTASSIUM SERPL-SCNC: 3.9 MMOL/L (ref 3.7–5.3)
PROT SERPL-MCNC: 7.1 G/DL (ref 6.6–8.7)
SODIUM SERPL-SCNC: 145 MMOL/L (ref 136–145)
T4 FREE SERPL-MCNC: 1.5 NG/DL (ref 0.9–1.7)
TRIGL SERPL-MCNC: 194 MG/DL
TSH SERPL DL<=0.05 MIU/L-ACNC: 4.34 UIU/ML (ref 0.27–4.2)
VLDLC SERPL CALC-MCNC: 39 MG/DL (ref 1–30)

## 2025-04-01 ENCOUNTER — HOSPITAL ENCOUNTER (OUTPATIENT)
Age: 65
Discharge: HOME | End: 2025-04-03
Payer: MEDICARE

## 2025-04-01 DIAGNOSIS — R06.02 SHORTNESS OF BREATH: ICD-10-CM

## 2025-04-01 LAB
ECHO AO ASC DIAM: 3.3 CM
ECHO AO ROOT DIAM: 2.8 CM
ECHO AV AREA PEAK VELOCITY: 1.7 CM2
ECHO AV AREA VTI: 1.8 CM2
ECHO AV MEAN GRADIENT: 7 MMHG
ECHO AV MEAN VELOCITY: 1.3 M/S
ECHO AV PEAK GRADIENT: 14 MMHG
ECHO AV PEAK VELOCITY: 1.9 M/S
ECHO AV VELOCITY RATIO: 0.63
ECHO AV VTI: 39.4 CM
ECHO IVC PROX: 1.9 CM
ECHO LA AREA 2C: 16.7 CM2
ECHO LA AREA 4C: 19.9 CM2
ECHO LA DIAMETER: 4 CM
ECHO LA MAJOR AXIS: 5.1 CM
ECHO LA MINOR AXIS: 5 CM
ECHO LA TO AORTIC ROOT RATIO: 1.43
ECHO LA VOL BP: 53 ML (ref 22–52)
ECHO LA VOL MOD A2C: 45 ML (ref 22–52)
ECHO LA VOL MOD A4C: 62 ML (ref 22–52)
ECHO LV E' LATERAL VELOCITY: 8.7 CM/S
ECHO LV E' SEPTAL VELOCITY: 8.16 CM/S
ECHO LV EDV A2C: 87 ML
ECHO LV EDV A4C: 86 ML
ECHO LV EF PHYSICIAN: 65 %
ECHO LV EJECTION FRACTION A2C: 70 %
ECHO LV EJECTION FRACTION A4C: 62 %
ECHO LV EJECTION FRACTION BIPLANE: 65 % (ref 55–100)
ECHO LV ESV A2C: 26 ML
ECHO LV ESV A4C: 33 ML
ECHO LV FRACTIONAL SHORTENING: 36 % (ref 28–44)
ECHO LV INTERNAL DIMENSION DIASTOLIC: 4.7 CM (ref 3.9–5.3)
ECHO LV INTERNAL DIMENSION SYSTOLIC: 3 CM
ECHO LV IVSD: 0.9 CM (ref 0.6–0.9)
ECHO LV MASS 2D: 142.7 G (ref 67–162)
ECHO LV POSTERIOR WALL DIASTOLIC: 0.9 CM (ref 0.6–0.9)
ECHO LV RELATIVE WALL THICKNESS RATIO: 0.38
ECHO LVOT AREA: 2.5 CM2
ECHO LVOT AV VTI INDEX: 0.71
ECHO LVOT DIAM: 1.8 CM
ECHO LVOT MEAN GRADIENT: 3 MMHG
ECHO LVOT PEAK GRADIENT: 6 MMHG
ECHO LVOT PEAK VELOCITY: 1.2 M/S
ECHO LVOT SV: 71.2 ML
ECHO LVOT VTI: 28 CM
ECHO MV A VELOCITY: 0.75 M/S
ECHO MV AREA VTI: 2 CM2
ECHO MV E DECELERATION TIME (DT): 201 MS
ECHO MV E VELOCITY: 0.84 M/S
ECHO MV E/A RATIO: 1.12
ECHO MV E/E' LATERAL: 9.66
ECHO MV E/E' RATIO (AVERAGED): 9.97
ECHO MV E/E' SEPTAL: 10.29
ECHO MV LVOT VTI INDEX: 1.25
ECHO MV MAX VELOCITY: 0.8 M/S
ECHO MV MEAN GRADIENT: 1 MMHG
ECHO MV MEAN VELOCITY: 0.5 M/S
ECHO MV PEAK GRADIENT: 3 MMHG
ECHO MV REGURGITANT PEAK GRADIENT: 21 MMHG
ECHO MV REGURGITANT PEAK VELOCITY: 2.3 M/S
ECHO MV VTI: 35 CM
ECHO PV MAX VELOCITY: 0.9 M/S
ECHO PV PEAK GRADIENT: 3 MMHG
ECHO RV FREE WALL PEAK S': 9.7 CM/S
ECHO RV TAPSE: 2 CM (ref 1.7–?)

## 2025-04-01 PROCEDURE — 93306 TTE W/DOPPLER COMPLETE: CPT

## 2025-04-18 ENCOUNTER — OFFICE VISIT (OUTPATIENT)
Dept: OBGYN CLINIC | Age: 65
End: 2025-04-18
Payer: MEDICARE

## 2025-04-18 VITALS
OXYGEN SATURATION: 96 % | DIASTOLIC BLOOD PRESSURE: 86 MMHG | HEIGHT: 65 IN | SYSTOLIC BLOOD PRESSURE: 144 MMHG | BODY MASS INDEX: 33.32 KG/M2 | HEART RATE: 75 BPM | WEIGHT: 200 LBS

## 2025-04-18 DIAGNOSIS — Z80.3 FAMILY HISTORY OF BREAST CANCER: ICD-10-CM

## 2025-04-18 DIAGNOSIS — R92.333 MAMMOGRAPHIC HETEROGENEOUS DENSITY, BILATERAL BREASTS: ICD-10-CM

## 2025-04-18 DIAGNOSIS — N64.4 BREAST PAIN, LEFT: ICD-10-CM

## 2025-04-18 DIAGNOSIS — Z91.89 INCREASED RISK OF BREAST CANCER: Primary | ICD-10-CM

## 2025-04-18 PROCEDURE — G8427 DOCREV CUR MEDS BY ELIG CLIN: HCPCS

## 2025-04-18 PROCEDURE — 1036F TOBACCO NON-USER: CPT

## 2025-04-18 PROCEDURE — 1090F PRES/ABSN URINE INCON ASSESS: CPT

## 2025-04-18 PROCEDURE — G8399 PT W/DXA RESULTS DOCUMENT: HCPCS

## 2025-04-18 PROCEDURE — 1123F ACP DISCUSS/DSCN MKR DOCD: CPT

## 2025-04-18 PROCEDURE — 3017F COLORECTAL CA SCREEN DOC REV: CPT

## 2025-04-18 PROCEDURE — G8417 CALC BMI ABV UP PARAM F/U: HCPCS

## 2025-04-18 PROCEDURE — 99213 OFFICE O/P EST LOW 20 MIN: CPT

## 2025-04-18 SDOH — ECONOMIC STABILITY: FOOD INSECURITY: WITHIN THE PAST 12 MONTHS, THE FOOD YOU BOUGHT JUST DIDN'T LAST AND YOU DIDN'T HAVE MONEY TO GET MORE.: NEVER TRUE

## 2025-04-18 SDOH — ECONOMIC STABILITY: FOOD INSECURITY: WITHIN THE PAST 12 MONTHS, YOU WORRIED THAT YOUR FOOD WOULD RUN OUT BEFORE YOU GOT MONEY TO BUY MORE.: NEVER TRUE

## 2025-04-18 ASSESSMENT — PATIENT HEALTH QUESTIONNAIRE - PHQ9
1. LITTLE INTEREST OR PLEASURE IN DOING THINGS: NOT AT ALL
SUM OF ALL RESPONSES TO PHQ QUESTIONS 1-9: 0
2. FEELING DOWN, DEPRESSED OR HOPELESS: NOT AT ALL
SUM OF ALL RESPONSES TO PHQ QUESTIONS 1-9: 0

## 2025-04-18 NOTE — PROGRESS NOTES
breast cancer 04/18/2025    Chronic sinusitis 11/14/2014      Prior to Admission medications    Medication Sig Start Date End Date Taking? Authorizing Provider   Turmeric (QC TUMERIC COMPLEX PO) Take by mouth   Yes Darrius Tripp MD   Omega-3 Fatty Acids (FISH OIL) 1000 MG CAPS Take 3 capsules by mouth 3 times daily   Yes Darrius Tripp MD   calcipotriene (DOVONEX) 0.005 % cream  1/15/20  Yes Darrius Tripp MD   BIOTIN PO Take by mouth   Yes Darrius Tripp MD   levothyroxine (SYNTHROID) 100 MCG tablet 1 tablet 11/3/16  Yes Darrius Tripp MD   vitamin E 400 UNIT capsule Take 4 capsules by mouth daily   Yes Darrius Tripp MD   Ascorbic Acid (VITAMIN C GUMMIE PO) Take 2 tablets by mouth daily.   Yes Darrius Tripp MD   ondansetron (ZOFRAN ODT) 4 MG disintegrating tablet Take 1 tablet by mouth every 8 hours as needed for Nausea  Patient not taking: Reported on 11/22/2022 10/11/21   Cara Regan MD   ibuprofen (IBU) 800 MG tablet Take 1 tablet by mouth every 6 hours as needed for Pain  Patient not taking: No sig reported 10/12/19   Donnell Ross DO   fluticasone (FLONASE) 50 MCG/ACT nasal spray 2 sprays by Nasal route daily.  Patient not taking: Reported on 4/6/2023    Darrius Tripp MD      has a past medical history of Mitral valve prolapse and Thyroid disease.   has a past surgical history that includes sinus surgery; Tonsillectomy; Pilonidal cyst excision; Ovary removal (Right); and sinus surgery (11/14/14).                                                                                                                      Review of Systems   Constitutional:  Negative for activity change, chills, fatigue, fever and unexpected weight change.   Eyes:  Negative for visual disturbance.   Respiratory:  Negative for shortness of breath.    Cardiovascular:  Negative for chest pain.   Gastrointestinal:  Negative for abdominal distention, abdominal pain,

## 2025-04-30 ASSESSMENT — ENCOUNTER SYMPTOMS
ABDOMINAL PAIN: 0
SHORTNESS OF BREATH: 0
ABDOMINAL DISTENTION: 0
ANAL BLEEDING: 0
CONSTIPATION: 0
BLOOD IN STOOL: 0

## 2025-05-29 ENCOUNTER — HOSPITAL ENCOUNTER (OUTPATIENT)
Dept: MRI IMAGING | Age: 65
Discharge: HOME | End: 2025-05-31
Payer: MEDICARE

## 2025-05-29 DIAGNOSIS — Z80.3 FAMILY HISTORY OF BREAST CANCER: ICD-10-CM

## 2025-05-29 DIAGNOSIS — N64.4 BREAST PAIN, LEFT: ICD-10-CM

## 2025-05-29 DIAGNOSIS — Z91.89 INCREASED RISK OF BREAST CANCER: ICD-10-CM

## 2025-05-29 DIAGNOSIS — R92.333 MAMMOGRAPHIC HETEROGENEOUS DENSITY, BILATERAL BREASTS: ICD-10-CM

## 2025-05-29 PROCEDURE — 6360000004 HC RX CONTRAST MEDICATION

## 2025-05-29 PROCEDURE — A9579 GAD-BASE MR CONTRAST NOS,1ML: HCPCS

## 2025-05-29 PROCEDURE — C8908 MRI W/O FOL W/CONT, BREAST,: HCPCS

## 2025-05-29 RX ADMIN — GADOTERIDOL 20 ML: 279.3 INJECTION, SOLUTION INTRAVENOUS at 11:38

## 2025-06-10 ENCOUNTER — RESULTS FOLLOW-UP (OUTPATIENT)
Dept: OBGYN CLINIC | Age: 65
End: 2025-06-10

## 2025-06-30 ENCOUNTER — TRANSCRIBE ORDERS (OUTPATIENT)
Dept: ADMINISTRATIVE | Age: 65
End: 2025-06-30

## 2025-06-30 DIAGNOSIS — I10 ESSENTIAL HYPERTENSION: Primary | ICD-10-CM

## 2025-09-05 ENCOUNTER — HOSPITAL ENCOUNTER (EMERGENCY)
Age: 65
Discharge: HOME | End: 2025-09-05
Attending: EMERGENCY MEDICINE
Payer: MEDICARE

## 2025-09-05 ENCOUNTER — APPOINTMENT (OUTPATIENT)
Dept: CT IMAGING | Age: 65
End: 2025-09-05
Payer: MEDICARE

## 2025-09-05 VITALS
HEART RATE: 72 BPM | DIASTOLIC BLOOD PRESSURE: 67 MMHG | TEMPERATURE: 98.1 F | OXYGEN SATURATION: 100 % | SYSTOLIC BLOOD PRESSURE: 146 MMHG | RESPIRATION RATE: 16 BRPM

## 2025-09-05 DIAGNOSIS — I10 ESSENTIAL HYPERTENSION: ICD-10-CM

## 2025-09-05 DIAGNOSIS — R42 DIZZINESS: Primary | ICD-10-CM

## 2025-09-05 DIAGNOSIS — R51.9 NONINTRACTABLE HEADACHE, UNSPECIFIED CHRONICITY PATTERN, UNSPECIFIED HEADACHE TYPE: ICD-10-CM

## 2025-09-05 LAB
ALBUMIN SERPL-MCNC: 4.2 G/DL (ref 3.5–5.2)
ALBUMIN/GLOB SERPL: 1.3 {RATIO} (ref 1–2.5)
ALP SERPL-CCNC: 65 U/L (ref 35–104)
ALT SERPL-CCNC: 15 U/L (ref 10–35)
ANION GAP SERPL CALCULATED.3IONS-SCNC: 14 MMOL/L (ref 9–16)
AST SERPL-CCNC: 22 U/L (ref 10–35)
BASOPHILS # BLD: 0.05 K/UL (ref 0–0.2)
BASOPHILS NFR BLD: 1 % (ref 0–2)
BILIRUB SERPL-MCNC: 0.3 MG/DL (ref 0–1.2)
BNP SERPL-MCNC: 74 PG/ML (ref 0–125)
BUN SERPL-MCNC: 15 MG/DL (ref 8–23)
CALCIUM SERPL-MCNC: 9.9 MG/DL (ref 8.6–10.4)
CHLORIDE SERPL-SCNC: 104 MMOL/L (ref 98–107)
CO2 SERPL-SCNC: 22 MMOL/L (ref 20–31)
CREAT SERPL-MCNC: 0.8 MG/DL (ref 0.6–0.9)
EKG ATRIAL RATE: 57 BPM
EKG P AXIS: 28 DEGREES
EKG P-R INTERVAL: 200 MS
EKG Q-T INTERVAL: 452 MS
EKG QRS DURATION: 92 MS
EKG QTC CALCULATION (BAZETT): 439 MS
EKG R AXIS: 14 DEGREES
EKG T AXIS: 14 DEGREES
EKG VENTRICULAR RATE: 57 BPM
EOSINOPHIL # BLD: 0.08 K/UL (ref 0–0.44)
EOSINOPHILS RELATIVE PERCENT: 1 % (ref 1–4)
ERYTHROCYTE [DISTWIDTH] IN BLOOD BY AUTOMATED COUNT: 12.8 % (ref 11.8–14.4)
GFR, ESTIMATED: 81 ML/MIN/1.73M2
GLUCOSE SERPL-MCNC: 108 MG/DL (ref 74–99)
HCT VFR BLD AUTO: 42.5 % (ref 36.3–47.1)
HGB BLD-MCNC: 13.3 G/DL (ref 11.9–15.1)
IMM GRANULOCYTES # BLD AUTO: 0.07 K/UL (ref 0–0.3)
IMM GRANULOCYTES NFR BLD: 1 %
LYMPHOCYTES NFR BLD: 2.19 K/UL (ref 1.1–3.7)
LYMPHOCYTES RELATIVE PERCENT: 27 % (ref 24–43)
MCH RBC QN AUTO: 28.3 PG (ref 25.2–33.5)
MCHC RBC AUTO-ENTMCNC: 31.3 G/DL (ref 28.4–34.8)
MCV RBC AUTO: 90.4 FL (ref 82.6–102.9)
MONOCYTES NFR BLD: 0.96 K/UL (ref 0.1–1.2)
MONOCYTES NFR BLD: 12 % (ref 3–12)
NEUTROPHILS NFR BLD: 58 % (ref 36–65)
NEUTS SEG NFR BLD: 4.91 K/UL (ref 1.5–8.1)
NRBC BLD-RTO: 0 PER 100 WBC
PLATELET # BLD AUTO: 300 K/UL (ref 138–453)
PMV BLD AUTO: 9.2 FL (ref 8.1–13.5)
POTASSIUM SERPL-SCNC: 3.8 MMOL/L (ref 3.7–5.3)
PROT SERPL-MCNC: 7.5 G/DL (ref 6.6–8.7)
RBC # BLD AUTO: 4.7 M/UL (ref 3.95–5.11)
SODIUM SERPL-SCNC: 140 MMOL/L (ref 136–145)
TROPONIN I SERPL HS-MCNC: 12 NG/L (ref 0–14)
TROPONIN I SERPL HS-MCNC: 12 NG/L (ref 0–14)
TSH SERPL DL<=0.05 MIU/L-ACNC: 2.82 UIU/ML (ref 0.27–4.2)
WBC OTHER # BLD: 8.3 K/UL (ref 3.5–11.3)

## 2025-09-05 PROCEDURE — 84484 ASSAY OF TROPONIN QUANT: CPT

## 2025-09-05 PROCEDURE — 93005 ELECTROCARDIOGRAM TRACING: CPT

## 2025-09-05 PROCEDURE — 6370000000 HC RX 637 (ALT 250 FOR IP)

## 2025-09-05 PROCEDURE — 84443 ASSAY THYROID STIM HORMONE: CPT

## 2025-09-05 PROCEDURE — 80053 COMPREHEN METABOLIC PANEL: CPT

## 2025-09-05 PROCEDURE — 70450 CT HEAD/BRAIN W/O DYE: CPT

## 2025-09-05 PROCEDURE — 2580000003 HC RX 258

## 2025-09-05 PROCEDURE — 85025 COMPLETE CBC W/AUTO DIFF WBC: CPT

## 2025-09-05 PROCEDURE — 70498 CT ANGIOGRAPHY NECK: CPT

## 2025-09-05 PROCEDURE — 6360000004 HC RX CONTRAST MEDICATION

## 2025-09-05 PROCEDURE — 83880 ASSAY OF NATRIURETIC PEPTIDE: CPT

## 2025-09-05 RX ORDER — IBUPROFEN 400 MG/1
400 TABLET, FILM COATED ORAL ONCE
Status: COMPLETED | OUTPATIENT
Start: 2025-09-05 | End: 2025-09-05

## 2025-09-05 RX ORDER — AMLODIPINE BESYLATE 2.5 MG/1
5 TABLET ORAL DAILY
Qty: 28 TABLET | Refills: 0 | Status: SHIPPED | OUTPATIENT
Start: 2025-09-05 | End: 2025-09-19

## 2025-09-05 RX ORDER — IOPAMIDOL 755 MG/ML
90 INJECTION, SOLUTION INTRAVASCULAR
Status: COMPLETED | OUTPATIENT
Start: 2025-09-05 | End: 2025-09-05

## 2025-09-05 RX ORDER — AMLODIPINE BESYLATE 10 MG/1
5 TABLET ORAL DAILY
Status: DISCONTINUED | OUTPATIENT
Start: 2025-09-05 | End: 2025-09-05 | Stop reason: HOSPADM

## 2025-09-05 RX ORDER — AMLODIPINE BESYLATE 2.5 MG/1
5 TABLET ORAL DAILY
Qty: 28 TABLET | Refills: 0 | Status: SHIPPED | OUTPATIENT
Start: 2025-09-05 | End: 2025-09-05

## 2025-09-05 RX ORDER — MECLIZINE HYDROCHLORIDE 25 MG/1
25 TABLET ORAL 3 TIMES DAILY PRN
Qty: 30 TABLET | Refills: 0 | Status: SHIPPED | OUTPATIENT
Start: 2025-09-05 | End: 2025-09-15

## 2025-09-05 RX ORDER — 0.9 % SODIUM CHLORIDE 0.9 %
1000 INTRAVENOUS SOLUTION INTRAVENOUS ONCE
Status: COMPLETED | OUTPATIENT
Start: 2025-09-05 | End: 2025-09-05

## 2025-09-05 RX ADMIN — AMLODIPINE BESYLATE 5 MG: 10 TABLET ORAL at 14:48

## 2025-09-05 RX ADMIN — IBUPROFEN 400 MG: 400 TABLET ORAL at 15:41

## 2025-09-05 RX ADMIN — SODIUM CHLORIDE 1000 ML: 0.9 INJECTION, SOLUTION INTRAVENOUS at 14:48

## 2025-09-05 RX ADMIN — IOPAMIDOL 90 ML: 755 INJECTION, SOLUTION INTRAVENOUS at 12:55

## 2025-09-05 ASSESSMENT — PAIN - FUNCTIONAL ASSESSMENT: PAIN_FUNCTIONAL_ASSESSMENT: 0-10

## 2025-09-05 ASSESSMENT — PAIN SCALES - GENERAL: PAINLEVEL_OUTOF10: 7
